# Patient Record
Sex: FEMALE | Race: BLACK OR AFRICAN AMERICAN | NOT HISPANIC OR LATINO | URBAN - METROPOLITAN AREA
[De-identification: names, ages, dates, MRNs, and addresses within clinical notes are randomized per-mention and may not be internally consistent; named-entity substitution may affect disease eponyms.]

---

## 2019-07-11 ENCOUNTER — OUTPATIENT (OUTPATIENT)
Dept: OUTPATIENT SERVICES | Facility: HOSPITAL | Age: 59
LOS: 1 days | End: 2019-07-11
Payer: COMMERCIAL

## 2019-07-11 VITALS
HEIGHT: 64 IN | WEIGHT: 220.02 LBS | RESPIRATION RATE: 16 BRPM | HEART RATE: 58 BPM | OXYGEN SATURATION: 96 % | TEMPERATURE: 98 F | DIASTOLIC BLOOD PRESSURE: 73 MMHG | SYSTOLIC BLOOD PRESSURE: 137 MMHG

## 2019-07-11 DIAGNOSIS — M17.11 UNILATERAL PRIMARY OSTEOARTHRITIS, RIGHT KNEE: ICD-10-CM

## 2019-07-11 DIAGNOSIS — Z01.818 ENCOUNTER FOR OTHER PREPROCEDURAL EXAMINATION: ICD-10-CM

## 2019-07-11 DIAGNOSIS — M25.561 PAIN IN RIGHT KNEE: ICD-10-CM

## 2019-07-11 DIAGNOSIS — Z98.51 TUBAL LIGATION STATUS: Chronic | ICD-10-CM

## 2019-07-11 LAB
BLD GP AB SCN SERPL QL: SIGNIFICANT CHANGE UP
HBA1C BLD-MCNC: 5.6 % — SIGNIFICANT CHANGE UP (ref 4–5.6)

## 2019-07-11 RX ORDER — TRANEXAMIC ACID 100 MG/ML
1000 INJECTION, SOLUTION INTRAVENOUS ONCE
Refills: 0 | Status: DISCONTINUED | OUTPATIENT
Start: 2019-07-18 | End: 2019-07-18

## 2019-07-11 RX ORDER — SODIUM CHLORIDE 9 MG/ML
3 INJECTION INTRAMUSCULAR; INTRAVENOUS; SUBCUTANEOUS EVERY 8 HOURS
Refills: 0 | Status: DISCONTINUED | OUTPATIENT
Start: 2019-07-18 | End: 2019-07-20

## 2019-07-11 NOTE — H&P PST ADULT - SYMPTOMS
Contacted by patient's PCP due to concern for increased diarrhea.  - spoke with patient. She reports that her diarrhea has been increased since leaving the hospital (previously 4BM/day, currently 10BM/day). Previously having intermittent incontinence (leakage), but now having ~daily episodes. No abdominal pain, no vomitus. Able to tolerating PO    - differential for worsening diarrhea is broad. Given recent hospitalization, need to exclude infectious etiology (i.e. C.diff). She has history of microscopic colitis though negative on last colonoscopy. CT with some abnormalities of pancreas (mild fatty involution of the pancreatic head; no mass or fat stranding) on 11/14/18. She was seen by vascular surgery for mesenteric stenosis but was felt to be non-significant during prior hospitalization.    Plan  - infectious studies: c.diff, PCR pathogen panel  - stool studies: fecal fat, fecal elastase  - will refer for EUS evaluation of pancreas  - blood tests for neuroendocrine process: gastrin, calcitonin, somatostatin, VIP, chromograninA, tryptase (will order in clinic due to PPI therapy and can give false positive so will need to hold for ~10-days unless contraindication)  - can consider evaluation by interventional cardiology for >60% SMA stenosis  - will request clinic follow-up    none

## 2019-07-11 NOTE — H&P PST ADULT - HISTORY OF PRESENT ILLNESS
59year old female with pmhx of hypertension, prediabetes, CAD, hyperlipidemia (diet controlled) asthma, obesity and 2 leaking heart valves? presents with c/o intermittent right knee pain with associating stiffness x 10years. Patient denies any pain today and is here for presurgical testing for scheduled Right knee replacement on 7/18/19

## 2019-07-11 NOTE — H&P PST ADULT - NSICDXPASTMEDICALHX_GEN_ALL_CORE_FT
PAST MEDICAL HISTORY:  Asthma     CAD (coronary artery disease)     Hyperlipidemia     Hypertension     Obesity     Prediabetes

## 2019-07-11 NOTE — H&P PST ADULT - NSICDXPROBLEM_GEN_ALL_CORE_FT
PROBLEM DIAGNOSES  Problem: Unilateral primary osteoarthritis, right knee  Assessment and Plan: Patient given preoperative instructions for scheduled Right Knee Total Replacement on 7/18/19. Preoperative instructions to include taking antihypertensives (losartan/hcts and atenolol) with a sip of water the morning of surgery. Patient verbalized understanding

## 2019-07-12 LAB
MRSA PCR RESULT.: SIGNIFICANT CHANGE UP
S AUREUS DNA NOSE QL NAA+PROBE: SIGNIFICANT CHANGE UP

## 2019-07-17 RX ORDER — CELECOXIB 200 MG/1
200 CAPSULE ORAL ONCE
Refills: 0 | Status: COMPLETED | OUTPATIENT
Start: 2019-07-17 | End: 2019-07-18

## 2019-07-18 ENCOUNTER — INPATIENT (INPATIENT)
Facility: HOSPITAL | Age: 59
LOS: 1 days | Discharge: HOME | DRG: 470 | End: 2019-07-20
Attending: ORTHOPAEDIC SURGERY | Admitting: ORTHOPAEDIC SURGERY
Payer: COMMERCIAL

## 2019-07-18 VITALS
RESPIRATION RATE: 16 BRPM | HEART RATE: 55 BPM | OXYGEN SATURATION: 99 % | WEIGHT: 220.02 LBS | TEMPERATURE: 98 F | HEIGHT: 64 IN | DIASTOLIC BLOOD PRESSURE: 92 MMHG | SYSTOLIC BLOOD PRESSURE: 163 MMHG

## 2019-07-18 DIAGNOSIS — M25.561 PAIN IN RIGHT KNEE: ICD-10-CM

## 2019-07-18 DIAGNOSIS — Z98.51 TUBAL LIGATION STATUS: Chronic | ICD-10-CM

## 2019-07-18 LAB
ANION GAP SERPL CALC-SCNC: 7 MMOL/L — SIGNIFICANT CHANGE UP (ref 5–17)
BLD GP AB SCN SERPL QL: SIGNIFICANT CHANGE UP
BUN SERPL-MCNC: 14 MG/DL — SIGNIFICANT CHANGE UP (ref 7–18)
CALCIUM SERPL-MCNC: 8.5 MG/DL — SIGNIFICANT CHANGE UP (ref 8.4–10.5)
CHLORIDE SERPL-SCNC: 104 MMOL/L — SIGNIFICANT CHANGE UP (ref 96–108)
CO2 SERPL-SCNC: 28 MMOL/L — SIGNIFICANT CHANGE UP (ref 22–31)
CREAT SERPL-MCNC: 0.89 MG/DL — SIGNIFICANT CHANGE UP (ref 0.5–1.3)
GLUCOSE BLDC GLUCOMTR-MCNC: 96 MG/DL — SIGNIFICANT CHANGE UP (ref 70–99)
GLUCOSE BLDC GLUCOMTR-MCNC: 98 MG/DL — SIGNIFICANT CHANGE UP (ref 70–99)
GLUCOSE SERPL-MCNC: 97 MG/DL — SIGNIFICANT CHANGE UP (ref 70–99)
HCG UR QL: NEGATIVE — SIGNIFICANT CHANGE UP
HCT VFR BLD CALC: 38.4 % — SIGNIFICANT CHANGE UP (ref 34.5–45)
HGB BLD-MCNC: 11.7 G/DL — SIGNIFICANT CHANGE UP (ref 11.5–15.5)
MCHC RBC-ENTMCNC: 26.5 PG — LOW (ref 27–34)
MCHC RBC-ENTMCNC: 30.5 GM/DL — LOW (ref 32–36)
MCV RBC AUTO: 86.9 FL — SIGNIFICANT CHANGE UP (ref 80–100)
NRBC # BLD: 0 /100 WBCS — SIGNIFICANT CHANGE UP (ref 0–0)
PLATELET # BLD AUTO: 302 K/UL — SIGNIFICANT CHANGE UP (ref 150–400)
POTASSIUM SERPL-MCNC: 3.9 MMOL/L — SIGNIFICANT CHANGE UP (ref 3.5–5.3)
POTASSIUM SERPL-SCNC: 3.9 MMOL/L — SIGNIFICANT CHANGE UP (ref 3.5–5.3)
RBC # BLD: 4.42 M/UL — SIGNIFICANT CHANGE UP (ref 3.8–5.2)
RBC # FLD: 13.2 % — SIGNIFICANT CHANGE UP (ref 10.3–14.5)
SODIUM SERPL-SCNC: 139 MMOL/L — SIGNIFICANT CHANGE UP (ref 135–145)
WBC # BLD: 5.89 K/UL — SIGNIFICANT CHANGE UP (ref 3.8–10.5)
WBC # FLD AUTO: 5.89 K/UL — SIGNIFICANT CHANGE UP (ref 3.8–10.5)

## 2019-07-18 PROCEDURE — 36415 COLL VENOUS BLD VENIPUNCTURE: CPT

## 2019-07-18 PROCEDURE — 86901 BLOOD TYPING SEROLOGIC RH(D): CPT

## 2019-07-18 PROCEDURE — 87641 MR-STAPH DNA AMP PROBE: CPT

## 2019-07-18 PROCEDURE — 88311 DECALCIFY TISSUE: CPT | Mod: 26

## 2019-07-18 PROCEDURE — 86900 BLOOD TYPING SEROLOGIC ABO: CPT

## 2019-07-18 PROCEDURE — G0463: CPT

## 2019-07-18 PROCEDURE — 83036 HEMOGLOBIN GLYCOSYLATED A1C: CPT

## 2019-07-18 PROCEDURE — 88305 TISSUE EXAM BY PATHOLOGIST: CPT | Mod: 26

## 2019-07-18 PROCEDURE — 87640 STAPH A DNA AMP PROBE: CPT

## 2019-07-18 PROCEDURE — 86850 RBC ANTIBODY SCREEN: CPT

## 2019-07-18 PROCEDURE — 73562 X-RAY EXAM OF KNEE 3: CPT | Mod: 26,RT

## 2019-07-18 RX ORDER — HYDROMORPHONE HYDROCHLORIDE 2 MG/ML
0.5 INJECTION INTRAMUSCULAR; INTRAVENOUS; SUBCUTANEOUS EVERY 6 HOURS
Refills: 0 | Status: DISCONTINUED | OUTPATIENT
Start: 2019-07-18 | End: 2019-07-19

## 2019-07-18 RX ORDER — ALBUTEROL 90 UG/1
2 AEROSOL, METERED ORAL
Qty: 0 | Refills: 0 | DISCHARGE

## 2019-07-18 RX ORDER — ASCORBIC ACID 60 MG
500 TABLET,CHEWABLE ORAL
Refills: 0 | Status: DISCONTINUED | OUTPATIENT
Start: 2019-07-18 | End: 2019-07-20

## 2019-07-18 RX ORDER — OXYCODONE HYDROCHLORIDE 5 MG/1
5 TABLET ORAL EVERY 4 HOURS
Refills: 0 | Status: DISCONTINUED | OUTPATIENT
Start: 2019-07-18 | End: 2019-07-20

## 2019-07-18 RX ORDER — MOMETASONE FUROATE 220 UG/1
0 INHALANT RESPIRATORY (INHALATION)
Qty: 0 | Refills: 0 | DISCHARGE

## 2019-07-18 RX ORDER — FAMOTIDINE 10 MG/ML
20 INJECTION INTRAVENOUS EVERY 12 HOURS
Refills: 0 | Status: DISCONTINUED | OUTPATIENT
Start: 2019-07-18 | End: 2019-07-20

## 2019-07-18 RX ORDER — MAGNESIUM HYDROXIDE 400 MG/1
30 TABLET, CHEWABLE ORAL DAILY
Refills: 0 | Status: DISCONTINUED | OUTPATIENT
Start: 2019-07-18 | End: 2019-07-20

## 2019-07-18 RX ORDER — ACETAMINOPHEN 500 MG
1000 TABLET ORAL EVERY 6 HOURS
Refills: 0 | Status: COMPLETED | OUTPATIENT
Start: 2019-07-18 | End: 2019-07-19

## 2019-07-18 RX ORDER — GABAPENTIN 400 MG/1
100 CAPSULE ORAL EVERY 8 HOURS
Refills: 0 | Status: DISCONTINUED | OUTPATIENT
Start: 2019-07-18 | End: 2019-07-20

## 2019-07-18 RX ORDER — KETOROLAC TROMETHAMINE 30 MG/ML
30 SYRINGE (ML) INJECTION EVERY 6 HOURS
Refills: 0 | Status: DISCONTINUED | OUTPATIENT
Start: 2019-07-18 | End: 2019-07-20

## 2019-07-18 RX ORDER — ATENOLOL 25 MG/1
1 TABLET ORAL
Qty: 0 | Refills: 0 | DISCHARGE

## 2019-07-18 RX ORDER — ENOXAPARIN SODIUM 100 MG/ML
30 INJECTION SUBCUTANEOUS EVERY 12 HOURS
Refills: 0 | Status: DISCONTINUED | OUTPATIENT
Start: 2019-07-19 | End: 2019-07-20

## 2019-07-18 RX ORDER — CEFAZOLIN SODIUM 1 G
1000 VIAL (EA) INJECTION EVERY 8 HOURS
Refills: 0 | Status: COMPLETED | OUTPATIENT
Start: 2019-07-19 | End: 2019-07-19

## 2019-07-18 RX ORDER — HYDROCHLOROTHIAZIDE 25 MG
12.5 TABLET ORAL DAILY
Refills: 0 | Status: DISCONTINUED | OUTPATIENT
Start: 2019-07-18 | End: 2019-07-20

## 2019-07-18 RX ORDER — FOLIC ACID 0.8 MG
1 TABLET ORAL DAILY
Refills: 0 | Status: DISCONTINUED | OUTPATIENT
Start: 2019-07-18 | End: 2019-07-20

## 2019-07-18 RX ORDER — LOSARTAN/HYDROCHLOROTHIAZIDE 100MG-25MG
1 TABLET ORAL
Qty: 0 | Refills: 0 | DISCHARGE

## 2019-07-18 RX ORDER — ATENOLOL 25 MG/1
25 TABLET ORAL DAILY
Refills: 0 | Status: DISCONTINUED | OUTPATIENT
Start: 2019-07-18 | End: 2019-07-20

## 2019-07-18 RX ORDER — SENNA PLUS 8.6 MG/1
2 TABLET ORAL AT BEDTIME
Refills: 0 | Status: DISCONTINUED | OUTPATIENT
Start: 2019-07-18 | End: 2019-07-20

## 2019-07-18 RX ORDER — MOMETASONE FUROATE 220 UG/1
1 INHALANT RESPIRATORY (INHALATION)
Refills: 0 | Status: DISCONTINUED | OUTPATIENT
Start: 2019-07-18 | End: 2019-07-18

## 2019-07-18 RX ORDER — HYDROMORPHONE HYDROCHLORIDE 2 MG/ML
0.5 INJECTION INTRAMUSCULAR; INTRAVENOUS; SUBCUTANEOUS
Refills: 0 | Status: DISCONTINUED | OUTPATIENT
Start: 2019-07-18 | End: 2019-07-18

## 2019-07-18 RX ORDER — GABAPENTIN 400 MG/1
100 CAPSULE ORAL ONCE
Refills: 0 | Status: COMPLETED | OUTPATIENT
Start: 2019-07-18 | End: 2019-07-18

## 2019-07-18 RX ORDER — TRAMADOL HYDROCHLORIDE 50 MG/1
50 TABLET ORAL ONCE
Refills: 0 | Status: DISCONTINUED | OUTPATIENT
Start: 2019-07-18 | End: 2019-07-18

## 2019-07-18 RX ORDER — LOSARTAN POTASSIUM 100 MG/1
100 TABLET, FILM COATED ORAL DAILY
Refills: 0 | Status: DISCONTINUED | OUTPATIENT
Start: 2019-07-18 | End: 2019-07-20

## 2019-07-18 RX ORDER — ONDANSETRON 8 MG/1
4 TABLET, FILM COATED ORAL ONCE
Refills: 0 | Status: DISCONTINUED | OUTPATIENT
Start: 2019-07-18 | End: 2019-07-18

## 2019-07-18 RX ORDER — FERROUS SULFATE 325(65) MG
325 TABLET ORAL
Refills: 0 | Status: DISCONTINUED | OUTPATIENT
Start: 2019-07-18 | End: 2019-07-20

## 2019-07-18 RX ORDER — SODIUM CHLORIDE 9 MG/ML
1000 INJECTION INTRAMUSCULAR; INTRAVENOUS; SUBCUTANEOUS
Refills: 0 | Status: DISCONTINUED | OUTPATIENT
Start: 2019-07-18 | End: 2019-07-20

## 2019-07-18 RX ORDER — CELECOXIB 200 MG/1
200 CAPSULE ORAL DAILY
Refills: 0 | Status: DISCONTINUED | OUTPATIENT
Start: 2019-07-19 | End: 2019-07-20

## 2019-07-18 RX ORDER — DOCUSATE SODIUM 100 MG
100 CAPSULE ORAL THREE TIMES A DAY
Refills: 0 | Status: DISCONTINUED | OUTPATIENT
Start: 2019-07-18 | End: 2019-07-20

## 2019-07-18 RX ORDER — SODIUM CHLORIDE 9 MG/ML
1000 INJECTION, SOLUTION INTRAVENOUS
Refills: 0 | Status: DISCONTINUED | OUTPATIENT
Start: 2019-07-18 | End: 2019-07-18

## 2019-07-18 RX ORDER — ALBUTEROL 90 UG/1
2 AEROSOL, METERED ORAL EVERY 6 HOURS
Refills: 0 | Status: DISCONTINUED | OUTPATIENT
Start: 2019-07-18 | End: 2019-07-20

## 2019-07-18 RX ORDER — ONDANSETRON 8 MG/1
4 TABLET, FILM COATED ORAL EVERY 6 HOURS
Refills: 0 | Status: DISCONTINUED | OUTPATIENT
Start: 2019-07-18 | End: 2019-07-20

## 2019-07-18 RX ORDER — TRAMADOL HYDROCHLORIDE 50 MG/1
50 TABLET ORAL EVERY 8 HOURS
Refills: 0 | Status: DISCONTINUED | OUTPATIENT
Start: 2019-07-18 | End: 2019-07-20

## 2019-07-18 RX ORDER — CHLORHEXIDINE GLUCONATE 213 G/1000ML
1 SOLUTION TOPICAL ONCE
Refills: 0 | Status: COMPLETED | OUTPATIENT
Start: 2019-07-18 | End: 2019-07-18

## 2019-07-18 RX ORDER — CEFAZOLIN SODIUM 1 G
1000 VIAL (EA) INJECTION EVERY 8 HOURS
Refills: 0 | Status: DISCONTINUED | OUTPATIENT
Start: 2019-07-18 | End: 2019-07-18

## 2019-07-18 RX ADMIN — CELECOXIB 200 MILLIGRAM(S): 200 CAPSULE ORAL at 12:58

## 2019-07-18 RX ADMIN — Medication 100 MILLIGRAM(S): at 21:46

## 2019-07-18 RX ADMIN — TRAMADOL HYDROCHLORIDE 50 MILLIGRAM(S): 50 TABLET ORAL at 21:46

## 2019-07-18 RX ADMIN — GABAPENTIN 100 MILLIGRAM(S): 400 CAPSULE ORAL at 12:56

## 2019-07-18 RX ADMIN — TRAMADOL HYDROCHLORIDE 50 MILLIGRAM(S): 50 TABLET ORAL at 21:59

## 2019-07-18 RX ADMIN — SODIUM CHLORIDE 3 MILLILITER(S): 9 INJECTION INTRAMUSCULAR; INTRAVENOUS; SUBCUTANEOUS at 13:00

## 2019-07-18 RX ADMIN — TRAMADOL HYDROCHLORIDE 50 MILLIGRAM(S): 50 TABLET ORAL at 12:56

## 2019-07-18 RX ADMIN — GABAPENTIN 100 MILLIGRAM(S): 400 CAPSULE ORAL at 21:46

## 2019-07-18 RX ADMIN — CHLORHEXIDINE GLUCONATE 1 APPLICATION(S): 213 SOLUTION TOPICAL at 12:30

## 2019-07-18 RX ADMIN — SODIUM CHLORIDE 3 MILLILITER(S): 9 INJECTION INTRAMUSCULAR; INTRAVENOUS; SUBCUTANEOUS at 21:43

## 2019-07-18 RX ADMIN — HYDROMORPHONE HYDROCHLORIDE 0.5 MILLIGRAM(S): 2 INJECTION INTRAMUSCULAR; INTRAVENOUS; SUBCUTANEOUS at 21:15

## 2019-07-18 RX ADMIN — HYDROMORPHONE HYDROCHLORIDE 0.5 MILLIGRAM(S): 2 INJECTION INTRAMUSCULAR; INTRAVENOUS; SUBCUTANEOUS at 20:57

## 2019-07-18 NOTE — DISCHARGE NOTE PROVIDER - NSDCCPCAREPLAN_GEN_ALL_CORE_FT
PRINCIPAL DISCHARGE DIAGNOSIS  Diagnosis: Unilateral primary osteoarthritis, right knee  Assessment and Plan of Treatment: Pain Management- See Attached Medication Reconciliation  **StretchStrap Stretching exercises for Total knee replacement***  Weight Bearing Status: WBAT to RLE   Equipment needs: Commode, Walker  Dressing: Please keep bandage/dressing Clean, Dry, and Intact.  Incision Site: REMOVE STAPLES on 08/02/19  STAPLES AND DRESSING TO BE REMOVED BY NURSE  NURSE TO APPLY STERI-STRIPS TO THE WOUND AFTER STAPLE REMOVAL   Dvt prophylaxis: D/C LOVENOX on 08/02/19  PT/Occupational Therapy are Activities of Daily Living as appropriate  Follow up with Orthopedic Surgeon, Dr. Lee after STAPLES ARE REMOVED in TWO weeks at: [phone]

## 2019-07-18 NOTE — DISCHARGE NOTE PROVIDER - CARE PROVIDER_API CALL
Malcom Lee)  Orthopaedic Surgery  [address] [place] [address]  Phone: [phone]  Fax: [phone]  Follow Up Time: 2 weeks

## 2019-07-18 NOTE — DISCHARGE NOTE PROVIDER - HOSPITAL COURSE
58 y/o F s/p elective right total knee replacement. Patient with no acute post-op complications. Patient received physical therapy and pain management throughout hospital stay.

## 2019-07-19 DIAGNOSIS — J45.909 UNSPECIFIED ASTHMA, UNCOMPLICATED: ICD-10-CM

## 2019-07-19 DIAGNOSIS — I25.10 ATHEROSCLEROTIC HEART DISEASE OF NATIVE CORONARY ARTERY WITHOUT ANGINA PECTORIS: ICD-10-CM

## 2019-07-19 DIAGNOSIS — G89.18 OTHER ACUTE POSTPROCEDURAL PAIN: ICD-10-CM

## 2019-07-19 DIAGNOSIS — M25.561 PAIN IN RIGHT KNEE: ICD-10-CM

## 2019-07-19 DIAGNOSIS — E78.5 HYPERLIPIDEMIA, UNSPECIFIED: ICD-10-CM

## 2019-07-19 DIAGNOSIS — I10 ESSENTIAL (PRIMARY) HYPERTENSION: ICD-10-CM

## 2019-07-19 DIAGNOSIS — E66.9 OBESITY, UNSPECIFIED: ICD-10-CM

## 2019-07-19 DIAGNOSIS — Z96.651 PRESENCE OF RIGHT ARTIFICIAL KNEE JOINT: ICD-10-CM

## 2019-07-19 LAB
ANION GAP SERPL CALC-SCNC: 6 MMOL/L — SIGNIFICANT CHANGE UP (ref 5–17)
BUN SERPL-MCNC: 11 MG/DL — SIGNIFICANT CHANGE UP (ref 7–18)
CALCIUM SERPL-MCNC: 8.6 MG/DL — SIGNIFICANT CHANGE UP (ref 8.4–10.5)
CHLORIDE SERPL-SCNC: 102 MMOL/L — SIGNIFICANT CHANGE UP (ref 96–108)
CO2 SERPL-SCNC: 28 MMOL/L — SIGNIFICANT CHANGE UP (ref 22–31)
CREAT SERPL-MCNC: 0.74 MG/DL — SIGNIFICANT CHANGE UP (ref 0.5–1.3)
GLUCOSE SERPL-MCNC: 127 MG/DL — HIGH (ref 70–99)
HCT VFR BLD CALC: 37.9 % — SIGNIFICANT CHANGE UP (ref 34.5–45)
HGB BLD-MCNC: 11.6 G/DL — SIGNIFICANT CHANGE UP (ref 11.5–15.5)
MCHC RBC-ENTMCNC: 26.1 PG — LOW (ref 27–34)
MCHC RBC-ENTMCNC: 30.6 GM/DL — LOW (ref 32–36)
MCV RBC AUTO: 85.4 FL — SIGNIFICANT CHANGE UP (ref 80–100)
NRBC # BLD: 0 /100 WBCS — SIGNIFICANT CHANGE UP (ref 0–0)
PLATELET # BLD AUTO: 286 K/UL — SIGNIFICANT CHANGE UP (ref 150–400)
POTASSIUM SERPL-MCNC: 3.7 MMOL/L — SIGNIFICANT CHANGE UP (ref 3.5–5.3)
POTASSIUM SERPL-SCNC: 3.7 MMOL/L — SIGNIFICANT CHANGE UP (ref 3.5–5.3)
RBC # BLD: 4.44 M/UL — SIGNIFICANT CHANGE UP (ref 3.8–5.2)
RBC # FLD: 13 % — SIGNIFICANT CHANGE UP (ref 10.3–14.5)
SODIUM SERPL-SCNC: 136 MMOL/L — SIGNIFICANT CHANGE UP (ref 135–145)
WBC # BLD: 9.92 K/UL — SIGNIFICANT CHANGE UP (ref 3.8–10.5)
WBC # FLD AUTO: 9.92 K/UL — SIGNIFICANT CHANGE UP (ref 3.8–10.5)

## 2019-07-19 RX ADMIN — Medication 1 MILLIGRAM(S): at 11:30

## 2019-07-19 RX ADMIN — FAMOTIDINE 20 MILLIGRAM(S): 10 INJECTION INTRAVENOUS at 05:34

## 2019-07-19 RX ADMIN — Medication 1 TABLET(S): at 11:30

## 2019-07-19 RX ADMIN — TRAMADOL HYDROCHLORIDE 50 MILLIGRAM(S): 50 TABLET ORAL at 13:32

## 2019-07-19 RX ADMIN — Medication 1000 MILLIGRAM(S): at 06:32

## 2019-07-19 RX ADMIN — ATENOLOL 25 MILLIGRAM(S): 25 TABLET ORAL at 05:34

## 2019-07-19 RX ADMIN — HYDROMORPHONE HYDROCHLORIDE 0.5 MILLIGRAM(S): 2 INJECTION INTRAMUSCULAR; INTRAVENOUS; SUBCUTANEOUS at 05:34

## 2019-07-19 RX ADMIN — SODIUM CHLORIDE 3 MILLILITER(S): 9 INJECTION INTRAMUSCULAR; INTRAVENOUS; SUBCUTANEOUS at 06:32

## 2019-07-19 RX ADMIN — Medication 100 MILLIGRAM(S): at 08:56

## 2019-07-19 RX ADMIN — GABAPENTIN 100 MILLIGRAM(S): 400 CAPSULE ORAL at 22:39

## 2019-07-19 RX ADMIN — Medication 325 MILLIGRAM(S): at 17:08

## 2019-07-19 RX ADMIN — Medication 1000 MILLIGRAM(S): at 17:08

## 2019-07-19 RX ADMIN — CELECOXIB 200 MILLIGRAM(S): 200 CAPSULE ORAL at 11:34

## 2019-07-19 RX ADMIN — TRAMADOL HYDROCHLORIDE 50 MILLIGRAM(S): 50 TABLET ORAL at 22:39

## 2019-07-19 RX ADMIN — Medication 1000 MILLIGRAM(S): at 00:19

## 2019-07-19 RX ADMIN — HYDROMORPHONE HYDROCHLORIDE 0.5 MILLIGRAM(S): 2 INJECTION INTRAMUSCULAR; INTRAVENOUS; SUBCUTANEOUS at 14:05

## 2019-07-19 RX ADMIN — SODIUM CHLORIDE 3 MILLILITER(S): 9 INJECTION INTRAMUSCULAR; INTRAVENOUS; SUBCUTANEOUS at 22:36

## 2019-07-19 RX ADMIN — CELECOXIB 200 MILLIGRAM(S): 200 CAPSULE ORAL at 11:35

## 2019-07-19 RX ADMIN — Medication 500 MILLIGRAM(S): at 05:34

## 2019-07-19 RX ADMIN — TRAMADOL HYDROCHLORIDE 50 MILLIGRAM(S): 50 TABLET ORAL at 23:29

## 2019-07-19 RX ADMIN — ONDANSETRON 4 MILLIGRAM(S): 8 TABLET, FILM COATED ORAL at 17:56

## 2019-07-19 RX ADMIN — Medication 100 MILLIGRAM(S): at 00:20

## 2019-07-19 RX ADMIN — GABAPENTIN 100 MILLIGRAM(S): 400 CAPSULE ORAL at 05:34

## 2019-07-19 RX ADMIN — Medication 325 MILLIGRAM(S): at 08:56

## 2019-07-19 RX ADMIN — Medication 1000 MILLIGRAM(S): at 05:34

## 2019-07-19 RX ADMIN — Medication 1000 MILLIGRAM(S): at 11:33

## 2019-07-19 RX ADMIN — Medication 100 MILLIGRAM(S): at 13:32

## 2019-07-19 RX ADMIN — TRAMADOL HYDROCHLORIDE 50 MILLIGRAM(S): 50 TABLET ORAL at 14:07

## 2019-07-19 RX ADMIN — SODIUM CHLORIDE 3 MILLILITER(S): 9 INJECTION INTRAMUSCULAR; INTRAVENOUS; SUBCUTANEOUS at 13:28

## 2019-07-19 RX ADMIN — Medication 1000 MILLIGRAM(S): at 11:30

## 2019-07-19 RX ADMIN — FAMOTIDINE 20 MILLIGRAM(S): 10 INJECTION INTRAVENOUS at 17:07

## 2019-07-19 RX ADMIN — Medication 100 MILLIGRAM(S): at 22:39

## 2019-07-19 RX ADMIN — HYDROMORPHONE HYDROCHLORIDE 0.5 MILLIGRAM(S): 2 INJECTION INTRAMUSCULAR; INTRAVENOUS; SUBCUTANEOUS at 06:00

## 2019-07-19 RX ADMIN — GABAPENTIN 100 MILLIGRAM(S): 400 CAPSULE ORAL at 13:32

## 2019-07-19 RX ADMIN — Medication 325 MILLIGRAM(S): at 11:30

## 2019-07-19 RX ADMIN — Medication 12.5 MILLIGRAM(S): at 05:34

## 2019-07-19 RX ADMIN — Medication 500 MILLIGRAM(S): at 17:07

## 2019-07-19 RX ADMIN — Medication 30 MILLIGRAM(S): at 08:56

## 2019-07-19 RX ADMIN — Medication 100 MILLIGRAM(S): at 05:34

## 2019-07-19 RX ADMIN — LOSARTAN POTASSIUM 100 MILLIGRAM(S): 100 TABLET, FILM COATED ORAL at 05:34

## 2019-07-19 RX ADMIN — Medication 30 MILLIGRAM(S): at 10:21

## 2019-07-19 RX ADMIN — Medication 1000 MILLIGRAM(S): at 18:03

## 2019-07-19 RX ADMIN — HYDROMORPHONE HYDROCHLORIDE 0.5 MILLIGRAM(S): 2 INJECTION INTRAMUSCULAR; INTRAVENOUS; SUBCUTANEOUS at 14:50

## 2019-07-19 RX ADMIN — ENOXAPARIN SODIUM 30 MILLIGRAM(S): 100 INJECTION SUBCUTANEOUS at 06:38

## 2019-07-19 RX ADMIN — Medication 1000 MILLIGRAM(S): at 01:12

## 2019-07-19 RX ADMIN — ENOXAPARIN SODIUM 30 MILLIGRAM(S): 100 INJECTION SUBCUTANEOUS at 18:13

## 2019-07-19 NOTE — PHYSICAL THERAPY INITIAL EVALUATION ADULT - GENERAL OBSERVATIONS, REHAB EVAL
Consult received, chart reviewed. Patient received supine in bed, NAD, +SCDs, +IV (disconnected by RN). Patient agreed to EVALUATION from Physical Therapist. Pt. with 7/10 Rt. knee pain, reports meds ~1 hr ago and eager to attempt PT

## 2019-07-19 NOTE — PHYSICAL THERAPY INITIAL EVALUATION ADULT - ACTIVE RANGE OF MOTION EXAMINATION, REHAB EVAL
bilateral upper extremity Active ROM was WFL (within functional limits)/bilateral  lower extremity Active ROM was WFL (within functional limits)/except Rt. knee 0-80 deg, pain limite. Rt. hip 3~3/4 range

## 2019-07-19 NOTE — CONSULT NOTE ADULT - PROBLEM SELECTOR RECOMMENDATION 9
S/p right total knee replacement  incentive spirometry  pain control  DVT PPX  Ortho follow up  PT/Rehab

## 2019-07-19 NOTE — PHYSICAL THERAPY INITIAL EVALUATION ADULT - GAIT DISTANCE, PT EVAL
8ft, limited by pain an nausea (Pt. just ate breakfast prior to PT, post amb. had episode of vomiting, and reported significant improvement)

## 2019-07-19 NOTE — PHYSICAL THERAPY INITIAL EVALUATION ADULT - PLANNED THERAPY INTERVENTIONS, PT EVAL
transfer training/balance training/bed mobility training/ROM/gait training/postural re-education/strengthening/stretching

## 2019-07-19 NOTE — PHYSICAL THERAPY INITIAL EVALUATION ADULT - PASSIVE RANGE OF MOTION EXAMINATION, REHAB EVAL
except Rt. knee 0-85 deg, pain limited/bilateral lower extremity Passive ROM was WFL (within functional limits)/bilateral upper extremity Passive ROM was WFL (within functional limits)

## 2019-07-19 NOTE — PHYSICAL THERAPY INITIAL EVALUATION ADULT - BALANCE DISTURBANCE, IDENTIFIED IMPAIRMENT CONTRIBUTE, REHAB EVAL
impaired postural control/decreased strength/dizziness and nausea (both resolved post session sitting in chair)

## 2019-07-19 NOTE — DISCHARGE NOTE NURSING/CASE MANAGEMENT/SOCIAL WORK - NSDCDPATPORTLINK_GEN_ALL_CORE
You can access the [email or web address] Patient Portal, offered by [facility], by registering with the following website: [email or web address]

## 2019-07-19 NOTE — CONSULT NOTE ADULT - SUBJECTIVE AND OBJECTIVE BOX
Chief Complaint: right knee pain    HPI:  59year old female with pmhx of hypertension, prediabetes, CAD, hyperlipidemia (diet controlled) asthma, obesity and 2 leaking heart valves? presents with c/o intermittent right knee pain with associating stiffness x 10years. Patient denies any pain today and is here for presurgical testing for scheduled Right knee replacement on 7/18/19 (11 Jul 2019 10:39).    59 year old female s/p right knee replacement, pod#1.  Pt oob and in chair.  + complaints of right knee pain.  Pt states that pain is not relieved with toradol.  No nausea or vomiting  No chest pain or sob.  Pt was seen by PT today.        PAST MEDICAL & SURGICAL HISTORY:  Obesity  Prediabetes  Hyperlipidemia  CAD (coronary artery disease)  Asthma  Hypertension  History of tubal ligation      FAMILY HISTORY:  Family hx of hypertension  Family history of diabetes mellitus      SOCIAL HISTORY:  [x ] Denies Smoking, Alcohol, or Drug Use    Allergies    No Known Allergies    Intolerances        PAIN MEDICATIONS:  acetaminophen   Tablet .. 1000 milliGRAM(s) Oral every 6 hours  celecoxib 200 milliGRAM(s) Oral daily  gabapentin 100 milliGRAM(s) Oral every 8 hours  HYDROmorphone  Injectable 0.5 milliGRAM(s) IV Push every 6 hours PRN  ketorolac   Injectable 30 milliGRAM(s) IV Push every 6 hours PRN  ondansetron Injectable 4 milliGRAM(s) IV Push every 6 hours PRN  oxyCODONE    IR 5 milliGRAM(s) Oral every 4 hours PRN  traMADol 50 milliGRAM(s) Oral every 8 hours      Heme:  enoxaparin Injectable 30 milliGRAM(s) SubCutaneous every 12 hours    Antibiotics:    Cardiovascular:  ATENolol  Tablet 25 milliGRAM(s) Oral daily  hydrochlorothiazide 12.5 milliGRAM(s) Oral daily  losartan 100 milliGRAM(s) Oral daily    GI:  aluminum hydroxide/magnesium hydroxide/simethicone Suspension 30 milliLiter(s) Oral four times a day PRN  docusate sodium 100 milliGRAM(s) Oral three times a day  famotidine    Tablet 20 milliGRAM(s) Oral every 12 hours  magnesium hydroxide Suspension 30 milliLiter(s) Oral daily PRN  senna 2 Tablet(s) Oral at bedtime PRN    Endocrine:    All Other Medications:  ascorbic acid 500 milliGRAM(s) Oral two times a day  ferrous    sulfate 325 milliGRAM(s) Oral three times a day with meals  folic acid 1 milliGRAM(s) Oral daily  multivitamin 1 Tablet(s) Oral daily  sodium chloride 0.9% lock flush 3 milliLiter(s) IV Push every 8 hours  sodium chloride 0.9%. 1000 milliLiter(s) IV Continuous <Continuous>      REVIEW OF SYSTEMS:    CONSTITUTIONAL: No fever, weight loss, or fatigue  RESPIRATORY: No cough, wheezing, chills, or hemoptysis, No SOB  NECK: No neck pain  CARDIOVASCULAR: No chest pain, palpitations, dizziness, or leg swelling  GASTROINTESTINAL: No abdominal or epigastric pain.  No nausea, vomiting, or hematemesis.  No diarrhea. + constipation.  GENITOURINARY: No dysuria, frequency, hematuria or incontinence  NEUROLOGICAL: No headaches, memory loss, loss of strength, numbness or tremors  MUSCULOSKELETAL: + right knee pain    Vital Signs Last 24 Hrs  T(C): 37.2 (19 Jul 2019 05:16), Max: 37.2 (19 Jul 2019 05:16)  T(F): 98.9 (19 Jul 2019 05:16), Max: 98.9 (19 Jul 2019 05:16)  HR: 67 (19 Jul 2019 09:28) (60 - 77)  BP: 110/75 (19 Jul 2019 09:28) (110/75 - 156/66)  BP(mean): --  RR: 17 (19 Jul 2019 05:16) (16 - 20)  SpO2: 97% (19 Jul 2019 09:28) (96% - 100%)    PAIN SCORE:     5/10    SCALE USED: (1-10 VNRS)    PHYSICAL EXAM:    GENERAL: NAD, well-groomed, well-developed   NERVOUS SYSTEM: Alert and oriented x3, Motor strength 5/5 B/L upper and lower extremities, SLR -   CHEST/LUNG: Clear to percussion bilaterally, no rale, rhonchi or wheezing  HEART: Regular rate and rhythm, No murmurs, rubs, or gallops   ABDOMEN: Soft, nontender, nondistended, Bowel sounds present  BACK: No CVA tenderness, No paravertebral tenderness  EXTREMITIES: +2 peripheral extremities, no edema, cyanosis + decreased rom  + right knee tenderness    LABS:                          11.6   9.92  )-----------( 286      ( 19 Jul 2019 06:13 )             37.9     07-19    136  |  102  |  11  ----------------------------<  127<H>  3.7   |  28  |  0.74    Ca    8.6      19 Jul 2019 06:13            RADIOLOGY:    Drug Screen:        RECOMMENDATIONS    [ ]  [place]  Reviewed and Copied to Chart

## 2019-07-19 NOTE — CONSULT NOTE ADULT - SUBJECTIVE AND OBJECTIVE BOX
Patient is a 59y old  Female who presents with a chief complaint of R TKA (19 Jul 2019 08:49)  History of Present Illness:  History of Present Illness		  59year old female with pmhx of hypertension, prediabetes, CAD, hyperlipidemia (diet controlled) asthma, obesity and 2 leaking heart valves? presents with c/o intermittent right knee pain with associating stiffness x 10years. Patient denies any pain today and is here for presurgical testing for scheduled Right knee replacement on 7/18/19 . Awake, alert, comfortable out of bed in NAD.      INTERVAL HPI/OVERNIGHT EVENTS:  T(C): 37.2 (07-19-19 @ 05:16), Max: 37.2 (07-19-19 @ 05:16)  HR: 67 (07-19-19 @ 09:28) (55 - 77)  BP: 110/75 (07-19-19 @ 09:28) (110/75 - 163/92)  RR: 17 (07-19-19 @ 05:16) (16 - 20)  SpO2: 97% (07-19-19 @ 09:28) (96% - 100%)  Wt(kg): --  I&O's Summary    18 Jul 2019 07:01  -  19 Jul 2019 07:00  --------------------------------------------------------  IN: 1000 mL / OUT: 400 mL / NET: 600 mL        PAST MEDICAL & SURGICAL HISTORY:  Obesity  Prediabetes  Hyperlipidemia  CAD (coronary artery disease)  Asthma  Hypertension  History of tubal ligation      SOCIAL HISTORY  Alcohol:  Tobacco:  Illicit substance use:      FAMILY HISTORY:      LABS:                        11.6   9.92  )-----------( 286      ( 19 Jul 2019 06:13 )             37.9     07-19    136  |  102  |  11  ----------------------------<  127<H>  3.7   |  28  |  0.74    Ca    8.6      19 Jul 2019 06:13          CAPILLARY BLOOD GLUCOSE      POCT Blood Glucose.: 98 mg/dL (18 Jul 2019 18:44)  POCT Blood Glucose.: 96 mg/dL (18 Jul 2019 12:39)            MEDICATIONS  (STANDING):  acetaminophen   Tablet .. 1000 milliGRAM(s) Oral every 6 hours  ascorbic acid 500 milliGRAM(s) Oral two times a day  ATENolol  Tablet 25 milliGRAM(s) Oral daily  celecoxib 200 milliGRAM(s) Oral daily  docusate sodium 100 milliGRAM(s) Oral three times a day  enoxaparin Injectable 30 milliGRAM(s) SubCutaneous every 12 hours  famotidine    Tablet 20 milliGRAM(s) Oral every 12 hours  ferrous    sulfate 325 milliGRAM(s) Oral three times a day with meals  folic acid 1 milliGRAM(s) Oral daily  gabapentin 100 milliGRAM(s) Oral every 8 hours  hydrochlorothiazide 12.5 milliGRAM(s) Oral daily  losartan 100 milliGRAM(s) Oral daily  multivitamin 1 Tablet(s) Oral daily  sodium chloride 0.9% lock flush 3 milliLiter(s) IV Push every 8 hours  sodium chloride 0.9%. 1000 milliLiter(s) (140 mL/Hr) IV Continuous <Continuous>  traMADol 50 milliGRAM(s) Oral every 8 hours    MEDICATIONS  (PRN):  ALBUTerol    90 MICROgram(s) HFA Inhaler 2 Puff(s) Inhalation every 6 hours PRN Shortness of Breath and/or Wheezing  aluminum hydroxide/magnesium hydroxide/simethicone Suspension 30 milliLiter(s) Oral four times a day PRN Indigestion  HYDROmorphone  Injectable 0.5 milliGRAM(s) IV Push every 6 hours PRN Severe Pain (7 - 10)  ketorolac   Injectable 30 milliGRAM(s) IV Push every 6 hours PRN Severe Pain (7 - 10)  magnesium hydroxide Suspension 30 milliLiter(s) Oral daily PRN Constipation  ondansetron Injectable 4 milliGRAM(s) IV Push every 6 hours PRN Nausea and/or Vomiting  oxyCODONE    IR 5 milliGRAM(s) Oral every 4 hours PRN Moderate Pain (4 - 6)  senna 2 Tablet(s) Oral at bedtime PRN Constipation      REVIEW OF SYSTEMS:  CONSTITUTIONAL: No fever, weight loss, or fatigue  EYES: No eye pain, visual disturbances, or discharge  ENMT:  No difficulty hearing, tinnitus, vertigo; No sinus or throat pain  NECK: No pain or stiffness  RESPIRATORY: No cough, wheezing, chills or hemoptysis; No shortness of breath  CARDIOVASCULAR: No chest pain, palpitations, dizziness, or leg swelling  GASTROINTESTINAL: No abdominal or epigastric pain. No nausea, vomiting, or hematemesis; No diarrhea or constipation. No melena or hematochezia.  GENITOURINARY: No dysuria, frequency, hematuria, or incontinence  NEUROLOGICAL: No headaches, memory loss, loss of strength, numbness, or tremors  SKIN: No itching, burning, rashes, or lesions   LYMPH NODES: No enlarged glands  ENDOCRINE: No heat or cold intolerance; No hair loss  MUSCULOSKELETAL: + joint pain + swelling; No muscle, back, or extremity pain  PSYCHIATRIC: No depression, anxiety, mood swings, or difficulty sleeping  HEME/LYMPH: No easy bruising, or bleeding gums  ALLERY AND IMMUNOLOGIC: No hives or eczema    PHYSICAL EXAM:  GENERAL: NAD, well-groomed, well-developed  HEAD:  Atraumatic, Normocephalic  EYES: EOMI, PERRLA, conjunctiva and sclera clear  ENMT: No tonsillar erythema, exudates, or enlargement; Moist mucous membranes, Good dentition, No lesions  NECK: Supple, No JVD, Normal thyroid  NERVOUS SYSTEM:  Alert & Oriented X3, Good concentration; Motor Strength 5/5 B/L upper and lower extremities; DTRs 2+ intact and symmetric  CHEST/LUNG: Clear to percussion bilaterally; No rales, rhonchi, wheezing, or rubs  HEART: Regular rate and rhythm; No murmurs, rubs, or gallops  ABDOMEN: Soft, Nontender, Nondistended; Bowel sounds present  EXTREMITIES:  2+ Peripheral Pulses, No clubbing, cyanosis, + edema  LYMPH: No lymphadenopathy noted  SKIN: No rashes or lesions    RADIOLOGY & ADDITIONAL TESTS:    Imaging Personally Reviewed:  [ x] YES  [ ] NO    Consultant(s) Notes Reviewed:  [x ] YES  [ ] NO        Care Discussed with Consultants/Other Providers [x ] YES  [ ] NO

## 2019-07-19 NOTE — CONSULT NOTE ADULT - PROBLEM SELECTOR RECOMMENDATION 9
- Tramadol 50mg q 8 hours  - acetaminophen 1 gram q 6 hours for 24 hours only  - oxycodone 5 mg po q 6 hours prn - moderate pain  - celebrex 200mg po daily  - gabapentin 100mg po q 8 hours  - dc iv opioids  - stool softeners  - PT eval / oob  - ice to extremity  - toradol iv prn - severe pain

## 2019-07-19 NOTE — PHYSICAL THERAPY INITIAL EVALUATION ADULT - CRITERIA FOR SKILLED THERAPEUTIC INTERVENTIONS
anticipated equipment needs at discharge/rehab potential/predicted duration of therapy intervention/therapy frequency/functional limitations in following categories/risk reduction/prevention/impairments found/anticipated discharge recommendation

## 2019-07-19 NOTE — PHYSICAL THERAPY INITIAL EVALUATION ADULT - LIVES WITH, PROFILE
Son in a private house, 5 stairs to enter with either side rails available, once inside bedroom and bathroom on main floor

## 2019-07-20 VITALS
SYSTOLIC BLOOD PRESSURE: 129 MMHG | DIASTOLIC BLOOD PRESSURE: 71 MMHG | OXYGEN SATURATION: 100 % | TEMPERATURE: 99 F | HEART RATE: 77 BPM | RESPIRATION RATE: 17 BRPM

## 2019-07-20 LAB
ANION GAP SERPL CALC-SCNC: 6 MMOL/L — SIGNIFICANT CHANGE UP (ref 5–17)
BUN SERPL-MCNC: 14 MG/DL — SIGNIFICANT CHANGE UP (ref 7–18)
CALCIUM SERPL-MCNC: 8.6 MG/DL — SIGNIFICANT CHANGE UP (ref 8.4–10.5)
CHLORIDE SERPL-SCNC: 103 MMOL/L — SIGNIFICANT CHANGE UP (ref 96–108)
CO2 SERPL-SCNC: 29 MMOL/L — SIGNIFICANT CHANGE UP (ref 22–31)
CREAT SERPL-MCNC: 0.86 MG/DL — SIGNIFICANT CHANGE UP (ref 0.5–1.3)
GLUCOSE SERPL-MCNC: 112 MG/DL — HIGH (ref 70–99)
HCT VFR BLD CALC: 37.1 % — SIGNIFICANT CHANGE UP (ref 34.5–45)
HGB BLD-MCNC: 11.2 G/DL — LOW (ref 11.5–15.5)
MCHC RBC-ENTMCNC: 25.9 PG — LOW (ref 27–34)
MCHC RBC-ENTMCNC: 30.2 GM/DL — LOW (ref 32–36)
MCV RBC AUTO: 85.7 FL — SIGNIFICANT CHANGE UP (ref 80–100)
NRBC # BLD: 0 /100 WBCS — SIGNIFICANT CHANGE UP (ref 0–0)
PLATELET # BLD AUTO: 266 K/UL — SIGNIFICANT CHANGE UP (ref 150–400)
POTASSIUM SERPL-MCNC: 3.8 MMOL/L — SIGNIFICANT CHANGE UP (ref 3.5–5.3)
POTASSIUM SERPL-SCNC: 3.8 MMOL/L — SIGNIFICANT CHANGE UP (ref 3.5–5.3)
RBC # BLD: 4.33 M/UL — SIGNIFICANT CHANGE UP (ref 3.8–5.2)
RBC # FLD: 13.2 % — SIGNIFICANT CHANGE UP (ref 10.3–14.5)
SODIUM SERPL-SCNC: 138 MMOL/L — SIGNIFICANT CHANGE UP (ref 135–145)
WBC # BLD: 9.79 K/UL — SIGNIFICANT CHANGE UP (ref 3.8–10.5)
WBC # FLD AUTO: 9.79 K/UL — SIGNIFICANT CHANGE UP (ref 3.8–10.5)

## 2019-07-20 PROCEDURE — 82962 GLUCOSE BLOOD TEST: CPT

## 2019-07-20 PROCEDURE — 86901 BLOOD TYPING SEROLOGIC RH(D): CPT

## 2019-07-20 PROCEDURE — 88305 TISSUE EXAM BY PATHOLOGIST: CPT

## 2019-07-20 PROCEDURE — 73562 X-RAY EXAM OF KNEE 3: CPT

## 2019-07-20 PROCEDURE — 88311 DECALCIFY TISSUE: CPT

## 2019-07-20 PROCEDURE — 97162 PT EVAL MOD COMPLEX 30 MIN: CPT

## 2019-07-20 PROCEDURE — 36415 COLL VENOUS BLD VENIPUNCTURE: CPT

## 2019-07-20 PROCEDURE — 86850 RBC ANTIBODY SCREEN: CPT

## 2019-07-20 PROCEDURE — C1776: CPT

## 2019-07-20 PROCEDURE — 97110 THERAPEUTIC EXERCISES: CPT

## 2019-07-20 PROCEDURE — 86900 BLOOD TYPING SEROLOGIC ABO: CPT

## 2019-07-20 PROCEDURE — C1713: CPT

## 2019-07-20 PROCEDURE — 97530 THERAPEUTIC ACTIVITIES: CPT

## 2019-07-20 PROCEDURE — 80048 BASIC METABOLIC PNL TOTAL CA: CPT

## 2019-07-20 PROCEDURE — 97116 GAIT TRAINING THERAPY: CPT

## 2019-07-20 PROCEDURE — 85027 COMPLETE CBC AUTOMATED: CPT

## 2019-07-20 PROCEDURE — 81025 URINE PREGNANCY TEST: CPT

## 2019-07-20 RX ORDER — FOLIC ACID 0.8 MG
1 TABLET ORAL
Qty: 14 | Refills: 0
Start: 2019-07-20 | End: 2019-08-02

## 2019-07-20 RX ORDER — CELECOXIB 200 MG/1
1 CAPSULE ORAL
Qty: 7 | Refills: 0
Start: 2019-07-20 | End: 2019-07-26

## 2019-07-20 RX ORDER — ENOXAPARIN SODIUM 100 MG/ML
40 INJECTION SUBCUTANEOUS
Qty: 13 | Refills: 0
Start: 2019-07-20 | End: 2019-07-26

## 2019-07-20 RX ORDER — DOCUSATE SODIUM 100 MG
1 CAPSULE ORAL
Qty: 28 | Refills: 0
Start: 2019-07-20 | End: 2019-08-02

## 2019-07-20 RX ORDER — ASCORBIC ACID 60 MG
1 TABLET,CHEWABLE ORAL
Qty: 14 | Refills: 0
Start: 2019-07-20 | End: 2019-08-02

## 2019-07-20 RX ORDER — GABAPENTIN 400 MG/1
1 CAPSULE ORAL
Qty: 21 | Refills: 0
Start: 2019-07-20 | End: 2019-07-26

## 2019-07-20 RX ORDER — FERROUS SULFATE 325(65) MG
1 TABLET ORAL
Qty: 30 | Refills: 0
Start: 2019-07-20 | End: 2019-08-03

## 2019-07-20 RX ADMIN — CELECOXIB 200 MILLIGRAM(S): 200 CAPSULE ORAL at 12:19

## 2019-07-20 RX ADMIN — OXYCODONE HYDROCHLORIDE 5 MILLIGRAM(S): 5 TABLET ORAL at 11:54

## 2019-07-20 RX ADMIN — Medication 100 MILLIGRAM(S): at 06:10

## 2019-07-20 RX ADMIN — OXYCODONE HYDROCHLORIDE 5 MILLIGRAM(S): 5 TABLET ORAL at 12:19

## 2019-07-20 RX ADMIN — Medication 500 MILLIGRAM(S): at 06:10

## 2019-07-20 RX ADMIN — LOSARTAN POTASSIUM 100 MILLIGRAM(S): 100 TABLET, FILM COATED ORAL at 06:09

## 2019-07-20 RX ADMIN — TRAMADOL HYDROCHLORIDE 50 MILLIGRAM(S): 50 TABLET ORAL at 07:12

## 2019-07-20 RX ADMIN — Medication 1 MILLIGRAM(S): at 11:54

## 2019-07-20 RX ADMIN — ENOXAPARIN SODIUM 30 MILLIGRAM(S): 100 INJECTION SUBCUTANEOUS at 07:32

## 2019-07-20 RX ADMIN — Medication 325 MILLIGRAM(S): at 07:32

## 2019-07-20 RX ADMIN — ATENOLOL 25 MILLIGRAM(S): 25 TABLET ORAL at 06:11

## 2019-07-20 RX ADMIN — GABAPENTIN 100 MILLIGRAM(S): 400 CAPSULE ORAL at 06:09

## 2019-07-20 RX ADMIN — Medication 325 MILLIGRAM(S): at 11:54

## 2019-07-20 RX ADMIN — Medication 12.5 MILLIGRAM(S): at 06:09

## 2019-07-20 RX ADMIN — CELECOXIB 200 MILLIGRAM(S): 200 CAPSULE ORAL at 11:54

## 2019-07-20 RX ADMIN — SODIUM CHLORIDE 3 MILLILITER(S): 9 INJECTION INTRAMUSCULAR; INTRAVENOUS; SUBCUTANEOUS at 06:06

## 2019-07-20 RX ADMIN — Medication 1 TABLET(S): at 11:54

## 2019-07-20 RX ADMIN — TRAMADOL HYDROCHLORIDE 50 MILLIGRAM(S): 50 TABLET ORAL at 06:12

## 2019-07-20 RX ADMIN — FAMOTIDINE 20 MILLIGRAM(S): 10 INJECTION INTRAVENOUS at 06:10

## 2019-07-20 NOTE — PROGRESS NOTE ADULT - PROBLEM SELECTOR PLAN 1
S/p right total knee replacement  incentive spirometry  pain control  DVT PPX  Ortho follow up  PT/Rehab.

## 2019-07-20 NOTE — PROGRESS NOTE ADULT - SUBJECTIVE AND OBJECTIVE BOX
pt seen in icu [  ], reg med floor [ x  ], bed [ x ], chair at bedside [   ]    Awake ,alert , lying in bed in NAD. S/p Total knee replacement. Feeling better. Denies fever , chills , cough or sob at this moment.     REVIEW OF SYSTEMS:    CONSTITUTIONAL: No weakness, fevers or chills  EYES/ENT: No visual changes;  No vertigo or throat pain   NECK: No pain or stiffness  RESPIRATORY: No cough, wheezing, hemoptysis; No shortness of breath  CARDIOVASCULAR: No chest pain or palpitations  GASTROINTESTINAL: No abdominal or epigastric pain. No nausea, vomiting, or hematemesis; No diarrhea or constipation. No melena or hematochezia.  GENITOURINARY: No dysuria, frequency or hematuria  NEUROLOGICAL: No numbness or weakness  SKIN: No itching, burning, rashes, or lesions   All other review of systems is negative unless indicated above.    Physical Exam    General: WN/WD NAD  Neurology: A&Ox3, nonfocal, LYNN x 4  Respiratory: CTA B/L  CV: RRR, S1S2, no murmurs, rubs or gallops  Abdominal: Soft, NT, ND +BS, Last BM  Extremities: + incisional tenderness      Allergies  No Known Allergies      Health Issues  Right knee pain  Obesity  Prediabetes  Hyperlipidemia  CAD (coronary artery disease)  Asthma  Hypertension  History of tubal ligation      Vitals  T(F): 99 (07-20-19 @ 05:14), Max: 99 (07-20-19 @ 05:14)  HR: 77 (07-20-19 @ 05:14) (65 - 77)  BP: 129/71 (07-20-19 @ 05:14) (129/71 - 146/69)  RR: 17 (07-20-19 @ 05:14) (17 - 18)  SpO2: 100% (07-20-19 @ 05:14) (95% - 100%)  Wt(kg): --  CAPILLARY BLOOD GLUCOSE      POCT Blood Glucose.: 98 mg/dL (18 Jul 2019 18:44)      Labs                          11.2   9.79  )-----------( 266      ( 20 Jul 2019 06:47 )             37.1       07-20    138  |  103  |  14  ----------------------------<  112<H>  3.8   |  29  |  0.86    Ca    8.6      20 Jul 2019 06:47              Radiology Results  < from: Xray Knee 3 Views, Right (07.18.19 @ 19:29) >  Impression: Unremarkable right knee replacement.    No evidence for an acute fracture or dislocation.    Appropriate postoperative soft tissue air and anterior skin staples   adjacent to the right knee.    < end of copied text >      Meds    MEDICATIONS  (STANDING):  ascorbic acid 500 milliGRAM(s) Oral two times a day  ATENolol  Tablet 25 milliGRAM(s) Oral daily  celecoxib 200 milliGRAM(s) Oral daily  docusate sodium 100 milliGRAM(s) Oral three times a day  enoxaparin Injectable 30 milliGRAM(s) SubCutaneous every 12 hours  famotidine    Tablet 20 milliGRAM(s) Oral every 12 hours  ferrous    sulfate 325 milliGRAM(s) Oral three times a day with meals  folic acid 1 milliGRAM(s) Oral daily  gabapentin 100 milliGRAM(s) Oral every 8 hours  hydrochlorothiazide 12.5 milliGRAM(s) Oral daily  losartan 100 milliGRAM(s) Oral daily  multivitamin 1 Tablet(s) Oral daily  sodium chloride 0.9% lock flush 3 milliLiter(s) IV Push every 8 hours  sodium chloride 0.9%. 1000 milliLiter(s) (140 mL/Hr) IV Continuous <Continuous>  traMADol 50 milliGRAM(s) Oral every 8 hours      MEDICATIONS  (PRN):  ALBUTerol    90 MICROgram(s) HFA Inhaler 2 Puff(s) Inhalation every 6 hours PRN Shortness of Breath and/or Wheezing  aluminum hydroxide/magnesium hydroxide/simethicone Suspension 30 milliLiter(s) Oral four times a day PRN Indigestion  bisacodyl Suppository 10 milliGRAM(s) Rectal daily PRN If no bowel movement by postoperative day #2  ketorolac   Injectable 30 milliGRAM(s) IV Push every 6 hours PRN Severe Pain (7 - 10)  magnesium hydroxide Suspension 30 milliLiter(s) Oral daily PRN Constipation  ondansetron Injectable 4 milliGRAM(s) IV Push every 6 hours PRN Nausea and/or Vomiting  oxyCODONE    IR 5 milliGRAM(s) Oral every 4 hours PRN Moderate Pain (4 - 6)  senna 2 Tablet(s) Oral at bedtime PRN Constipation
59yFemale    Diagnosis:  S/p R Total Knee Replacement POD# 2    Patient was seen and evaluated at bedside. Patient with no acute complaints.   Pain is well controlled. Pt ambulating well with PT.   Denies CP/SOB, dyspnea, paresthesias, N/V/D, palpitations.     Vital Signs Last 24 Hrs  T(C): 37.2 (20 Jul 2019 05:14), Max: 37.2 (20 Jul 2019 05:14)  T(F): 99 (20 Jul 2019 05:14), Max: 99 (20 Jul 2019 05:14)  HR: 77 (20 Jul 2019 05:14) (65 - 77)  BP: 129/71 (20 Jul 2019 05:14) (129/71 - 146/69)  BP(mean): --  RR: 17 (20 Jul 2019 05:14) (17 - 18)  SpO2: 100% (20 Jul 2019 05:14) (95% - 100%)  I&O's Detail      Physical Exam:    General: AAOx3, NAD, resting comfortably in bed.    R KNEE:  Dressing is C/D/I. Dressing changed today- Skin is pink and warm. Staples intact. No erythema. SILT.  Wound with no drainage, healing well.   Lower extremity:  No calf tenderness, calves are soft. 2+pulses. NVI. (+)EHL/FHL/ADF/APF.  Good capillary refill.                           11.2   9.79  )-----------( 266      ( 20 Jul 2019 06:47 )             37.1     07-20    138  |  103  |  14  ----------------------------<  112<H>  3.8   |  29  |  0.86    Ca    8.6      20 Jul 2019 06:47        Impression:  59yFemale S/p R Total Knee Replacement POD# 2  Plan:  -  Pain management  -  Dvt prophylaxis  -  Daily Physical Therapy:  WBAT to RLE  -  Discharge planning: Home today  -  Heel bump to RLE  -  Dressing changed today   -  Incentive Spirometer  -  Case d/w DR. Lee
Ortho Note POD# 1  59yFemale    Diagnosis:  S/p R Total Knee Replacement POD# 1    Patient is seen and evaluated at bedside; offers no acute complaints. Pain is mild; well controlled.  Awaiting PT for ambulation.    Vital Signs Last 24 Hrs  T(C): 37.2 (19 Jul 2019 05:16), Max: 37.2 (19 Jul 2019 05:16)  T(F): 98.9 (19 Jul 2019 05:16), Max: 98.9 (19 Jul 2019 05:16)  HR: 70 (19 Jul 2019 05:16) (55 - 76)  BP: 142/63 (19 Jul 2019 05:16) (114/68 - 163/92)  BP(mean): --  RR: 17 (19 Jul 2019 05:16) (16 - 20)  SpO2: 98% (19 Jul 2019 05:16) (98% - 100%)  I&O's Detail    18 Jul 2019 07:01  -  19 Jul 2019 07:00  --------------------------------------------------------  IN:    lactated ringers.: 1000 mL  Total IN: 1000 mL    OUT:    Voided: 400 mL  Total OUT: 400 mL    Total NET: 600 mL          Physical Exam:    General: AAOx3, in NAD, resting comfortably in bed.    R knee:  In nl. alignment. Dressing is C/D/I.  Calves are soft, non-tender. 2+pulses. NVI.                          11.6   9.92  )-----------( 286      ( 19 Jul 2019 06:13 )             37.9     07-19    136  |  102  |  11  ----------------------------<  127<H>  3.7   |  28  |  0.74    Ca    8.6      19 Jul 2019 06:13        Impression:  59yFemale S/p R total knee replacement POD# 1  Plan:  -  Continue pain management  -  DVT prophylaxis with Lovenox  -  Daily Physical Therapy:  WBAT on RLE with walker  -  Discharge planning: Home Vs. Rehab pending Physical therapy eval.  -  Continue Antibiotics x 24hrs post-op.  -  Encouraged use of incentive spirometer  -  Case d/w

## 2019-07-22 LAB — SURGICAL PATHOLOGY STUDY: SIGNIFICANT CHANGE UP

## 2020-05-13 NOTE — PATIENT PROFILE ADULT - NSPROPTRIGHTSUPPORTPERSON_GEN_A_NUR
From: Magnolia Damon  To: Laura Coulter MD  Sent: 5/13/2020 1:55 PM CDT  Subject: Medication Question    Norma,    The pharmacist informed me that the ER release comes in the 300ER and 450ER strength not the 150. Therefore it would have to be the 450 ER prescription.    Thanks,  Magnolia  
Previous rx d/c from med list new rx sent to pharmacy for li 450 mg CR    Message sent to pt   
same name as above

## 2022-06-01 NOTE — BRIEF OPERATIVE NOTE - CO SURGEON
dr nguyễn Detail Level: Detailed Quality 130: Documentation Of Current Medications In The Medical Record: Current Medications Documented

## 2023-02-14 NOTE — H&P PST ADULT - NS PRO LAST MENSTRUAL DATE
May 2019 Cimetidine Counseling:  I discussed with the patient the risks of Cimetidine including but not limited to gynecomastia, headache, diarrhea, nausea, drowsiness, arrhythmias, pancreatitis, skin rashes, psychosis, bone marrow suppression and kidney toxicity.

## 2023-05-23 NOTE — ASU PREOP CHECKLIST - LOOSE TEETH
Patient: Sebastien Mchugh  MR#: [id number]  Date of Note: 2023  Location: [facility]  : 1965 Sex: male  Physician: Clari Diaz MD    PROGRESS NOTE    PRIMARY CARE DOCTOR:  Brian Rodriguez MD     CHIEF COMPLAINT:  abd pain    HISTORY OF PRESENT ILLNESS:  I had the pleasure of seeing Mr. Mchugh again in clinic today. He is a pleasant 57 year old-year-old male who presents for a follow up visit regarding is chronic ongoing right sided abdominal pain.  Since I last saw him I ordered a small bowel follow thru which appeared normal.  He tried miralax for a few weeks with moderate improvement of his symptoms but then the pain recurred.  He has a hx of dumping syndrome from prior gastrectomy which he states is not truly related to his pain.    No incisional concerns.       PHYSICAL EXAMINATION:  VITAL SIGNS:   Visit Vitals  BP (!) 125/92 (BP Location: LUE - Left upper extremity, Patient Position: Sitting, Cuff Size: Large Adult)   Pulse 73   Temp 97 °F (36.1 °C) (Oral)   Ht 5' 4\" (1.626 m)   Wt 72.3 kg (159 lb 6.3 oz)   BMI 27.36 kg/m²     GEN: NAD alert and oriented, well nourished  CV: RRR, s1s2, no murmurs gallops or rubs  RESP: bilateral breath sounds, no crackles, no rhonchi, no wheezes, equal excursion, no accessory muscle use  ABDOMEN: Nondistended. Soft. Bowel sounds x4.  Incisions are well healed.    SKIN: Warm      ASSESSMENT AND PLAN:  Mr. Mchugh is a pleasant 57 year old-year-old male who presents status post prior robotic AWR followed by open ashlie, then take back for internal hernia.    He has had chronic ongoing right sided abdominal pain for some weeks now.  We discussed again at length diagnostic laparoscopy vs exploratory laparotomy for lysis of adhesions.  We discussed the yield may be low and puts him at risk for future adhesions and abdominal pain moving forwards.  He will discuss with his family and follow up in a few weeks.    Thank you for allowing me to participate in  the care of your patient. It was a pleasure.    Sincerely,      Clari Diaz MD     no

## 2023-09-19 NOTE — H&P PST ADULT - CONSTITUTIONAL
Patient Name: Parker Acevedo  Medical Record Number: [id number]  Today's Date: 9/19/2023    Procedure: Tunneled line exchange  Proceduralist: Dr. Fong, Dr. Wren, Dr. Nieves  Pathology present: no    Procedure Start: 1610  Procedure end: 1650  Sedation medications administered: Local lidocaine     Report given to:  RN  : no    Other Notes: Pt arrived to IR room 2 from . Consent reviewed. Pt denies any questions or concerns regarding procedure. Pt positioned supine and monitored per protocol.     Pt tolerated procedure without any noted complications.     Line placed under image guidance and ok for immediate use.  Both lumens flushed with 2.5 mls of 10 units/ml of Heparin.    Pt transferred back to .     Pt has Fentanyl patch on R arm   detailed exam

## 2024-10-02 PROBLEM — E66.9 OBESITY, UNSPECIFIED: Chronic | Status: ACTIVE | Noted: 2019-07-11

## 2024-10-02 PROBLEM — J45.909 UNSPECIFIED ASTHMA, UNCOMPLICATED: Chronic | Status: ACTIVE | Noted: 2019-07-11

## 2024-10-02 PROBLEM — R73.03 PREDIABETES: Chronic | Status: ACTIVE | Noted: 2019-07-11

## 2024-10-02 PROBLEM — I25.10 ATHEROSCLEROTIC HEART DISEASE OF NATIVE CORONARY ARTERY WITHOUT ANGINA PECTORIS: Chronic | Status: ACTIVE | Noted: 2019-07-11

## 2024-10-02 PROBLEM — E78.5 HYPERLIPIDEMIA, UNSPECIFIED: Chronic | Status: ACTIVE | Noted: 2019-07-11

## 2024-10-02 PROBLEM — I10 ESSENTIAL (PRIMARY) HYPERTENSION: Chronic | Status: ACTIVE | Noted: 2019-07-11

## 2024-10-07 ENCOUNTER — OUTPATIENT (OUTPATIENT)
Dept: OUTPATIENT SERVICES | Facility: HOSPITAL | Age: 64
LOS: 1 days | End: 2024-10-07
Payer: COMMERCIAL

## 2024-10-07 VITALS
TEMPERATURE: 98 F | HEART RATE: 78 BPM | DIASTOLIC BLOOD PRESSURE: 80 MMHG | HEIGHT: 64 IN | WEIGHT: 210.1 LBS | OXYGEN SATURATION: 98 % | SYSTOLIC BLOOD PRESSURE: 164 MMHG | RESPIRATION RATE: 16 BRPM

## 2024-10-07 DIAGNOSIS — I10 ESSENTIAL (PRIMARY) HYPERTENSION: ICD-10-CM

## 2024-10-07 DIAGNOSIS — J45.909 UNSPECIFIED ASTHMA, UNCOMPLICATED: ICD-10-CM

## 2024-10-07 DIAGNOSIS — Z98.51 TUBAL LIGATION STATUS: Chronic | ICD-10-CM

## 2024-10-07 DIAGNOSIS — E11.9 TYPE 2 DIABETES MELLITUS WITHOUT COMPLICATIONS: ICD-10-CM

## 2024-10-07 DIAGNOSIS — M17.12 UNILATERAL PRIMARY OSTEOARTHRITIS, LEFT KNEE: ICD-10-CM

## 2024-10-07 DIAGNOSIS — Z01.818 ENCOUNTER FOR OTHER PREPROCEDURAL EXAMINATION: ICD-10-CM

## 2024-10-07 DIAGNOSIS — Z96.651 PRESENCE OF RIGHT ARTIFICIAL KNEE JOINT: Chronic | ICD-10-CM

## 2024-10-07 LAB — BLD GP AB SCN SERPL QL: SIGNIFICANT CHANGE UP

## 2024-10-07 NOTE — H&P PST ADULT - NSICDXPASTMEDICALHX_GEN_ALL_CORE_FT
PAST MEDICAL HISTORY:  Asthma     CAD (coronary artery disease)     Hyperlipidemia     Hypertension     Obesity     Prediabetes      PAST MEDICAL HISTORY:  Asthma     CAD (coronary artery disease)     Hyperlipidemia     Hypertension     Obesity     Osteoarthritis     Prediabetes

## 2024-10-07 NOTE — H&P PST ADULT - ASSESSMENT
64 y.o female with PMHx for Asthma, CAD, HLD, HTN, Obesity, DM, Osteoarthritis right knee s/p Right Total Knee replacement 2019. Now with c/o of worsening left knee pain. On w/u found to have Unilateral Primary Osteoarthritis left knee and now for schedule Left Knee Replacement on 10/10/24 with Dr Lee  64 y.o female with  Unilateral Primary Osteoarthritis left knee and now for schedule Left Knee Replacement on 10/10/24 with Dr Jesus Lee

## 2024-10-07 NOTE — H&P PST ADULT - MUSCULOSKELETAL
details… decreased ROM/decreased ROM due to pain/strength 5/5 bilateral lower extremities decreased ROM/decreased ROM due to pain/strength 5/5 bilateral upper extremities

## 2024-10-07 NOTE — H&P PST ADULT - HISTORY OF PRESENT ILLNESS
64 y.o female with PMHx for Asthma, CAD, HLD, HTN, Obesity, DM, Osteoarthritis right knee s/p Right Total Knee replacement 2019. Now with c/o of worsening left knee pain. On w/u found to have Unilateral Primary Osteoarthritis left knee and now for schedule Left Knee Replacement on 10/10/24 with Dr Lee  64 y.o female with PMHx for Asthma,  HLD, HTN, Obesity, Osteoarthritis right knee s/p Right Total Knee replacement 2019. Now with c/o of worsening left knee pain. On w/u found to have Unilateral Primary Osteoarthritis left knee and now for schedule Left Knee Replacement on 10/10/24 with Dr Lee

## 2024-10-07 NOTE — H&P PST ADULT - PROBLEM SELECTOR PLAN 1
Pt schedule for Right Hip Replacement on 11/16/23 with Dr Lee    Labs drawn in PCP - will /fu report   Pt was seen by PCP for Medical clearance- will f/u report     pt was swabbed for MRSA/MSSA -will f/u with result   Pt Instructed on use of Mupirocin if nasal swab positive    Pt was  instructed to stop aspirin/ecotrin and all over the counter medication including vitamins and herbal supplements one week prior to surgery   Instructions given on the use of 4% chlorhexidine wash and Pt verbalized understanding of same   Pt Instructed to have nothing by mouth starting midnight day before surgery  Patient is to expect a phone call day before surgery between the hours of 430- 630pm giving arrival time for surgery   Written and verbal preoperative instructions given to patient with understanding verbalized.   Post surgery instruction given by Elie, booklets and pamphlets also given    Patient today with STOP bang score 3  Low  risk for JAYE Pt schedule for Right Hip Replacement on 11/16/23 with Dr Lee    Labs drawn in PCP - will /fu report   Pt was seen by PCP for Medical clearance- will f/u report     pt was swabbed for MRSA/MSSA -will f/u with result   Pt Instructed on use of Mupirocin if nasal swab positive    Pt was  instructed to stop aspirin/ecotrin and all over the counter medication including vitamins and herbal supplements one week prior to surgery   Instructions given on the use of 4% chlorhexidine wash and Pt verbalized understanding of same   Pt Instructed to have nothing by mouth starting midnight day before surgery  Patient is to expect a phone call day before surgery between the hours of 430- 630pm giving arrival time for surgery   Written and verbal preoperative instructions given to patient with understanding verbalized.   Post surgery instruction given by Elie, booklets and pamphlets also given    Patient today with STOP bang score 5  high  risk for JAYE

## 2024-10-08 LAB
A1C WITH ESTIMATED AVERAGE GLUCOSE RESULT: 6.1 % — HIGH (ref 4–5.6)
ESTIMATED AVERAGE GLUCOSE: 128 MG/DL — HIGH (ref 68–114)
MRSA PCR RESULT.: SIGNIFICANT CHANGE UP
S AUREUS DNA NOSE QL NAA+PROBE: SIGNIFICANT CHANGE UP

## 2024-10-08 PROCEDURE — 87640 STAPH A DNA AMP PROBE: CPT

## 2024-10-08 PROCEDURE — 36415 COLL VENOUS BLD VENIPUNCTURE: CPT

## 2024-10-08 PROCEDURE — G0463: CPT

## 2024-10-08 PROCEDURE — 86900 BLOOD TYPING SEROLOGIC ABO: CPT

## 2024-10-08 PROCEDURE — 83036 HEMOGLOBIN GLYCOSYLATED A1C: CPT

## 2024-10-08 PROCEDURE — 86901 BLOOD TYPING SEROLOGIC RH(D): CPT

## 2024-10-08 PROCEDURE — 86850 RBC ANTIBODY SCREEN: CPT

## 2024-10-08 PROCEDURE — 87641 MR-STAPH DNA AMP PROBE: CPT

## 2024-10-08 NOTE — CHART NOTE - NSCHARTNOTEFT_GEN_A_CORE
PRE-TJR SOCIAL/FUNCTIONAL SCREENING Via:     Telephone Call  10/08/2024     Preferred Language :English   Patient Telephone #: [phone]  EMAIL ADDRESS: [email or web address]   Insurance:  [id number]  Pt stated Goal for Surgery : " To get better and walk without pain in my knee."   SURGERY DETAILS:  Sx Date:  10/10 /2024                                                                                           Surgery Type:  Left   Knee Replacement. Pt had a Right TKA done in 2019  Surgeon Dr. Jesus NASHT Tool 11/12   Attitude towards SDD- Pt states feels nervous about going home same day, Pt was explained the process- understands will be Dced home after once cleared for DC by ORTHO and PT     SOCIAL HISTORY:  Live With alone    in a  House     Stairs Required to Access (Street to Front Door) Residence:    Yes; 2 Railing: Right     Once Inside Pt Residence:   To Access a Bathroom:      No Stairs Required     To Access a Bedroom Where Pt. Can Sleep:  No Stairs Required    Pt. Currently Has Formal Home Health Services:  No     MOBILITY HISTORY:   Baseline Ambulation- Pt is Independent in ambulation without assistive device  Pt. Owns Any Other Medical Equipment?  No, patient will need rolling walker and 3-in-1 commode      MEDICAL HISTORY:  Pt has any History of Back Surgery:   No   Pt has any Allergies:  No    Patient denies diagnosis of sleep apnea or use of CPAP    Pt. Pharmacy Information:  Name:  [facility] pt- received phone call from pharmacy awaiting to have meds delivered                   Address:          Telephone #:     Pt. will Require Transportation to Home Upon Discharge: No- daughter will  the patient once pt is cleared for DC by ORTHO and PT     For Post-Acute Care:  Pt. prefers [facility] for post-acute needs     Pt electronically sent pre- op education book "What to do before and after knee replacement".  All details of upcoming procedure discussed including, precautions, the throughput process, post-op process including transportation, home-care and follow-up as well as important information regarding blood clots, pneumonia, falls, infection and pain.  All questions answered and pt. verbalized understanding.  Pt. has Director of PT phone number for follow up as needed.

## 2024-10-10 ENCOUNTER — INPATIENT (INPATIENT)
Facility: HOSPITAL | Age: 64
LOS: 0 days | Discharge: HOME HEALTH | DRG: 554 | End: 2024-10-11
Attending: ORTHOPAEDIC SURGERY | Admitting: ORTHOPAEDIC SURGERY
Payer: COMMERCIAL

## 2024-10-10 VITALS
HEART RATE: 72 BPM | SYSTOLIC BLOOD PRESSURE: 130 MMHG | WEIGHT: 210.1 LBS | TEMPERATURE: 98 F | RESPIRATION RATE: 16 BRPM | OXYGEN SATURATION: 98 % | DIASTOLIC BLOOD PRESSURE: 76 MMHG | HEIGHT: 64 IN

## 2024-10-10 DIAGNOSIS — E78.5 HYPERLIPIDEMIA, UNSPECIFIED: ICD-10-CM

## 2024-10-10 DIAGNOSIS — M17.12 UNILATERAL PRIMARY OSTEOARTHRITIS, LEFT KNEE: ICD-10-CM

## 2024-10-10 DIAGNOSIS — I25.10 ATHEROSCLEROTIC HEART DISEASE OF NATIVE CORONARY ARTERY WITHOUT ANGINA PECTORIS: ICD-10-CM

## 2024-10-10 DIAGNOSIS — Z96.651 PRESENCE OF RIGHT ARTIFICIAL KNEE JOINT: Chronic | ICD-10-CM

## 2024-10-10 DIAGNOSIS — Z01.818 ENCOUNTER FOR OTHER PREPROCEDURAL EXAMINATION: ICD-10-CM

## 2024-10-10 DIAGNOSIS — G89.18 OTHER ACUTE POSTPROCEDURAL PAIN: ICD-10-CM

## 2024-10-10 DIAGNOSIS — Z96.652 PRESENCE OF LEFT ARTIFICIAL KNEE JOINT: ICD-10-CM

## 2024-10-10 DIAGNOSIS — J45.909 UNSPECIFIED ASTHMA, UNCOMPLICATED: ICD-10-CM

## 2024-10-10 DIAGNOSIS — Z98.51 TUBAL LIGATION STATUS: Chronic | ICD-10-CM

## 2024-10-10 DIAGNOSIS — R73.03 PREDIABETES: ICD-10-CM

## 2024-10-10 DIAGNOSIS — I10 ESSENTIAL (PRIMARY) HYPERTENSION: ICD-10-CM

## 2024-10-10 LAB
ANION GAP SERPL CALC-SCNC: 7 MMOL/L — SIGNIFICANT CHANGE UP (ref 5–17)
BLD GP AB SCN SERPL QL: SIGNIFICANT CHANGE UP
BUN SERPL-MCNC: 12 MG/DL — SIGNIFICANT CHANGE UP (ref 7–18)
CALCIUM SERPL-MCNC: 9.5 MG/DL — SIGNIFICANT CHANGE UP (ref 8.4–10.5)
CHLORIDE SERPL-SCNC: 100 MMOL/L — SIGNIFICANT CHANGE UP (ref 96–108)
CO2 SERPL-SCNC: 27 MMOL/L — SIGNIFICANT CHANGE UP (ref 22–31)
CREAT SERPL-MCNC: 0.78 MG/DL — SIGNIFICANT CHANGE UP (ref 0.5–1.3)
EGFR: 85 ML/MIN/1.73M2 — SIGNIFICANT CHANGE UP
GLUCOSE BLDC GLUCOMTR-MCNC: 143 MG/DL — HIGH (ref 70–99)
GLUCOSE BLDC GLUCOMTR-MCNC: 172 MG/DL — HIGH (ref 70–99)
GLUCOSE SERPL-MCNC: 180 MG/DL — HIGH (ref 70–99)
HCT VFR BLD CALC: 38.2 % — SIGNIFICANT CHANGE UP (ref 34.5–45)
HGB BLD-MCNC: 12.3 G/DL — SIGNIFICANT CHANGE UP (ref 11.5–15.5)
MCHC RBC-ENTMCNC: 27 PG — SIGNIFICANT CHANGE UP (ref 27–34)
MCHC RBC-ENTMCNC: 32.2 GM/DL — SIGNIFICANT CHANGE UP (ref 32–36)
MCV RBC AUTO: 83.8 FL — SIGNIFICANT CHANGE UP (ref 80–100)
NRBC # BLD: 0 /100 WBCS — SIGNIFICANT CHANGE UP (ref 0–0)
PLATELET # BLD AUTO: 353 K/UL — SIGNIFICANT CHANGE UP (ref 150–400)
POTASSIUM SERPL-MCNC: 3.3 MMOL/L — LOW (ref 3.5–5.3)
POTASSIUM SERPL-SCNC: 3.3 MMOL/L — LOW (ref 3.5–5.3)
RBC # BLD: 4.56 M/UL — SIGNIFICANT CHANGE UP (ref 3.8–5.2)
RBC # FLD: 12.4 % — SIGNIFICANT CHANGE UP (ref 10.3–14.5)
SODIUM SERPL-SCNC: 134 MMOL/L — LOW (ref 135–145)
WBC # BLD: 9.81 K/UL — SIGNIFICANT CHANGE UP (ref 3.8–10.5)
WBC # FLD AUTO: 9.81 K/UL — SIGNIFICANT CHANGE UP (ref 3.8–10.5)

## 2024-10-10 PROCEDURE — 73560 X-RAY EXAM OF KNEE 1 OR 2: CPT | Mod: 26,LT

## 2024-10-10 PROCEDURE — 99222 1ST HOSP IP/OBS MODERATE 55: CPT

## 2024-10-10 DEVICE — INSERT TIB BEARING PS X3 SZ 3 9MM STRL: Type: IMPLANTABLE DEVICE | Status: FUNCTIONAL

## 2024-10-10 DEVICE — CEMENT SIMPLEX WITH TOBRAMYCIN: Type: IMPLANTABLE DEVICE | Status: FUNCTIONAL

## 2024-10-10 DEVICE — STRYKER PIN 1/8 X 3.5" LONG: Type: IMPLANTABLE DEVICE | Status: FUNCTIONAL

## 2024-10-10 DEVICE — COMP FEM TRIATHLON PS SZ 4 LT: Type: IMPLANTABLE DEVICE | Status: FUNCTIONAL

## 2024-10-10 DEVICE — BASEPLATE TIB UNIV TRIATHLON SZ 3: Type: IMPLANTABLE DEVICE | Status: FUNCTIONAL

## 2024-10-10 DEVICE — COMP PATELLA ASYMMETRIC X3 32X10MM: Type: IMPLANTABLE DEVICE | Status: FUNCTIONAL

## 2024-10-10 RX ORDER — HYDROMORPHONE HYDROCHLORIDE 1 MG/ML
2 INJECTION, SOLUTION INTRAMUSCULAR; INTRAVENOUS; SUBCUTANEOUS
Refills: 0 | Status: DISCONTINUED | OUTPATIENT
Start: 2024-10-10 | End: 2024-10-10

## 2024-10-10 RX ORDER — ALBUTEROL 90 MCG
2 AEROSOL (GRAM) INHALATION EVERY 6 HOURS
Refills: 0 | Status: DISCONTINUED | OUTPATIENT
Start: 2024-10-10 | End: 2024-10-10

## 2024-10-10 RX ORDER — KETOROLAC TROMETHAMINE 10 MG/1
30 TABLET, FILM COATED ORAL EVERY 6 HOURS
Refills: 0 | Status: DISCONTINUED | OUTPATIENT
Start: 2024-10-10 | End: 2024-10-11

## 2024-10-10 RX ORDER — CELECOXIB 200 MG/1
200 CAPSULE ORAL ONCE
Refills: 0 | Status: COMPLETED | OUTPATIENT
Start: 2024-10-10 | End: 2024-10-10

## 2024-10-10 RX ORDER — ACETAMINOPHEN 325 MG
975 TABLET ORAL ONCE
Refills: 0 | Status: COMPLETED | OUTPATIENT
Start: 2024-10-10 | End: 2024-10-10

## 2024-10-10 RX ORDER — AMLODIPINE BESYLATE 5 MG
1 TABLET ORAL
Refills: 0 | DISCHARGE

## 2024-10-10 RX ORDER — TRAMADOL HYDROCHLORIDE 50 MG/1
50 TABLET, COATED ORAL EVERY 8 HOURS
Refills: 0 | Status: DISCONTINUED | OUTPATIENT
Start: 2024-10-10 | End: 2024-10-11

## 2024-10-10 RX ORDER — SENNOSIDES 8.6 MG
2 TABLET ORAL AT BEDTIME
Refills: 0 | Status: DISCONTINUED | OUTPATIENT
Start: 2024-10-10 | End: 2024-10-11

## 2024-10-10 RX ORDER — ONDANSETRON HCL/PF 4 MG/2 ML
4 VIAL (ML) INJECTION ONCE
Refills: 0 | Status: DISCONTINUED | OUTPATIENT
Start: 2024-10-10 | End: 2024-10-10

## 2024-10-10 RX ORDER — PANTOPRAZOLE SODIUM 40 MG/1
40 TABLET, DELAYED RELEASE ORAL
Refills: 0 | Status: DISCONTINUED | OUTPATIENT
Start: 2024-10-10 | End: 2024-10-11

## 2024-10-10 RX ORDER — TRAMADOL HYDROCHLORIDE 50 MG/1
50 TABLET, COATED ORAL ONCE
Refills: 0 | Status: DISCONTINUED | OUTPATIENT
Start: 2024-10-10 | End: 2024-10-10

## 2024-10-10 RX ORDER — CHLORHEXIDINE GLUCONATE ORAL RINSE 1.2 MG/ML
1 SOLUTION DENTAL ONCE
Refills: 0 | Status: COMPLETED | OUTPATIENT
Start: 2024-10-10 | End: 2024-10-10

## 2024-10-10 RX ORDER — MAGNESIUM HYDROXIDE 400 MG/5ML
30 SUSPENSION, ORAL (FINAL DOSE FORM) ORAL DAILY
Refills: 0 | Status: DISCONTINUED | OUTPATIENT
Start: 2024-10-10 | End: 2024-10-11

## 2024-10-10 RX ORDER — ONDANSETRON HCL/PF 4 MG/2 ML
4 VIAL (ML) INJECTION EVERY 6 HOURS
Refills: 0 | Status: DISCONTINUED | OUTPATIENT
Start: 2024-10-10 | End: 2024-10-11

## 2024-10-10 RX ORDER — OXYCODONE HYDROCHLORIDE 30 MG/1
10 TABLET, FILM COATED, EXTENDED RELEASE ORAL ONCE
Refills: 0 | Status: DISCONTINUED | OUTPATIENT
Start: 2024-10-10 | End: 2024-10-10

## 2024-10-10 RX ORDER — CEFAZOLIN SODIUM 1 G
2000 VIAL (EA) INJECTION EVERY 8 HOURS
Refills: 0 | Status: COMPLETED | OUTPATIENT
Start: 2024-10-10 | End: 2024-10-11

## 2024-10-10 RX ORDER — CELECOXIB 200 MG/1
200 CAPSULE ORAL EVERY 24 HOURS
Refills: 0 | Status: DISCONTINUED | OUTPATIENT
Start: 2024-10-11 | End: 2024-10-11

## 2024-10-10 RX ORDER — HYDROMORPHONE HYDROCHLORIDE 1 MG/ML
1 INJECTION, SOLUTION INTRAMUSCULAR; INTRAVENOUS; SUBCUTANEOUS
Refills: 0 | Status: DISCONTINUED | OUTPATIENT
Start: 2024-10-10 | End: 2024-10-10

## 2024-10-10 RX ORDER — SODIUM CHLORIDE IRRIG SOLUTION 0.9 %
1000 SOLUTION, IRRIGATION IRRIGATION
Refills: 0 | Status: DISCONTINUED | OUTPATIENT
Start: 2024-10-10 | End: 2024-10-10

## 2024-10-10 RX ORDER — OXYCODONE HYDROCHLORIDE 30 MG/1
5 TABLET, FILM COATED, EXTENDED RELEASE ORAL EVERY 4 HOURS
Refills: 0 | Status: DISCONTINUED | OUTPATIENT
Start: 2024-10-10 | End: 2024-10-11

## 2024-10-10 RX ORDER — METOPROLOL TARTRATE 50 MG
1 TABLET ORAL
Refills: 0 | DISCHARGE

## 2024-10-10 RX ORDER — SODIUM CHLORIDE 0.9 % (FLUSH) 0.9 %
3 SYRINGE (ML) INJECTION EVERY 8 HOURS
Refills: 0 | Status: DISCONTINUED | OUTPATIENT
Start: 2024-10-10 | End: 2024-10-11

## 2024-10-10 RX ORDER — HYDROCHLOROTHIAZIDE 50 MG/1
1 TABLET ORAL
Refills: 0 | DISCHARGE

## 2024-10-10 RX ORDER — SODIUM CHLORIDE 0.9 % (FLUSH) 0.9 %
1000 SYRINGE (ML) INJECTION
Refills: 0 | Status: DISCONTINUED | OUTPATIENT
Start: 2024-10-10 | End: 2024-10-11

## 2024-10-10 RX ORDER — METOPROLOL TARTRATE 50 MG
50 TABLET ORAL DAILY
Refills: 0 | Status: DISCONTINUED | OUTPATIENT
Start: 2024-10-10 | End: 2024-10-10

## 2024-10-10 RX ORDER — ACETAMINOPHEN 325 MG
1000 TABLET ORAL EVERY 6 HOURS
Refills: 0 | Status: COMPLETED | OUTPATIENT
Start: 2024-10-10 | End: 2024-10-11

## 2024-10-10 RX ORDER — ENOXAPARIN SODIUM 150 MG/ML
30 INJECTION SUBCUTANEOUS EVERY 12 HOURS
Refills: 0 | Status: DISCONTINUED | OUTPATIENT
Start: 2024-10-10 | End: 2024-10-11

## 2024-10-10 RX ORDER — FERROUS SULFATE 325(65) MG
325 TABLET ORAL DAILY
Refills: 0 | Status: DISCONTINUED | OUTPATIENT
Start: 2024-10-10 | End: 2024-10-11

## 2024-10-10 RX ORDER — AMLODIPINE BESYLATE 5 MG
5 TABLET ORAL DAILY
Refills: 0 | Status: DISCONTINUED | OUTPATIENT
Start: 2024-10-10 | End: 2024-10-10

## 2024-10-10 RX ADMIN — OXYCODONE HYDROCHLORIDE 5 MILLIGRAM(S): 30 TABLET, FILM COATED, EXTENDED RELEASE ORAL at 16:30

## 2024-10-10 RX ADMIN — TRAMADOL HYDROCHLORIDE 50 MILLIGRAM(S): 50 TABLET, COATED ORAL at 21:45

## 2024-10-10 RX ADMIN — Medication 3 MILLILITER(S): at 21:43

## 2024-10-10 RX ADMIN — Medication 100 MILLIGRAM(S): at 18:58

## 2024-10-10 RX ADMIN — OXYCODONE HYDROCHLORIDE 5 MILLIGRAM(S): 30 TABLET, FILM COATED, EXTENDED RELEASE ORAL at 15:51

## 2024-10-10 RX ADMIN — CELECOXIB 200 MILLIGRAM(S): 200 CAPSULE ORAL at 09:16

## 2024-10-10 RX ADMIN — Medication 1000 MILLIGRAM(S): at 23:31

## 2024-10-10 RX ADMIN — Medication 975 MILLIGRAM(S): at 09:17

## 2024-10-10 RX ADMIN — ENOXAPARIN SODIUM 30 MILLIGRAM(S): 150 INJECTION SUBCUTANEOUS at 19:58

## 2024-10-10 RX ADMIN — CHLORHEXIDINE GLUCONATE ORAL RINSE 1 APPLICATION(S): 1.2 SOLUTION DENTAL at 09:16

## 2024-10-10 RX ADMIN — Medication 1000 MILLIGRAM(S): at 18:58

## 2024-10-10 RX ADMIN — Medication 120 MILLILITER(S): at 15:51

## 2024-10-10 RX ADMIN — Medication 17 GRAM(S): at 21:45

## 2024-10-10 RX ADMIN — Medication 1000 MILLIGRAM(S): at 19:30

## 2024-10-10 RX ADMIN — TRAMADOL HYDROCHLORIDE 50 MILLIGRAM(S): 50 TABLET, COATED ORAL at 09:16

## 2024-10-10 RX ADMIN — Medication 2 TABLET(S): at 21:45

## 2024-10-10 NOTE — PHYSICAL THERAPY INITIAL EVALUATION ADULT - DIAGNOSIS, PT EVAL
(ICF Model) Pt. present w/deficits in Body Structures/Function (Impairments), incl: Strength, Balance, ROM, Pain leading to deficits in performing the below noted Activities (Limitations).

## 2024-10-10 NOTE — ASU PATIENT PROFILE, ADULT - NSICDXPASTMEDICALHX_GEN_ALL_CORE_FT
PAST MEDICAL HISTORY:  Asthma     CAD (coronary artery disease)     Hyperlipidemia     Hypertension     Obesity     Osteoarthritis     Prediabetes

## 2024-10-10 NOTE — PHYSICAL THERAPY INITIAL EVALUATION ADULT - MANUAL MUSCLE TESTING RESULTS, REHAB EVAL
B UE + R LE >4/5. L LE limited assessment due to bandage. L LE ~ 3-/5 in all motions./grossly assessed due to

## 2024-10-10 NOTE — PHYSICAL THERAPY INITIAL EVALUATION ADULT - RANGE OF MOTION EXAMINATION, REHAB EVAL
L LE ~(5) degrees ext and 15 degrees of flexion/bilateral upper extremity ROM was WNL (within normal limits)

## 2024-10-10 NOTE — CONSULT NOTE ADULT - SUBJECTIVE AND OBJECTIVE BOX
Source of information: ALEXANDER PACHECO, Chart review  Patient language: English  : n/a    HPI:  Patient is a 64 year old female with a PMHs significant of obesity, prediabetes, HLD, CAD, HTN, asthma, and OA of left knee. Patient is admitted for scheduled Left Total Knee Replacement with Dr. Narvaez on 10/10/24, now POD 0. Pain service consulted 10/10 for acute postoperative pain. Pt seen and examined at bedside, this afternoon. At time of assessment, patient laying in bed in NAD, reports pain score 6/10, primary RN notified to administer PRN pain medication as ordered SCALE USED: (1-10 VNRS). Pt describes pain as localized to left knee joint, constant and throbbing in quality. The pain is currently exacerbated by movement. Pt tolerating sips of water. Denies lethargy, chest pain, SOB, nausea, vomiting, constipation. Reports last BM 10/10. Patient stated goal for pain control: to be able to take deep breaths, get out of bed to chair and ambulate with tolerable pain control. Pt reports taking Acetaminophen for pain at home and ambulates independently at baseline.    PAST MEDICAL & SURGICAL HISTORY:  Hypertension    Asthma    CAD (coronary artery disease)    Hyperlipidemia    Prediabetes    Obesity    Osteoarthritis    History of tubal ligation    H/O total knee replacement, right    FAMILY HISTORY:  Family history of diabetes mellitus    Family hx of hypertension    Social History:  [x] Denies ETOH use, illicit drug use and smoking    Allergies    No Known Allergies    Intolerances    MEDICATIONS  (STANDING):  acetaminophen     Tablet .. 1000 milliGRAM(s) Oral every 6 hours  ceFAZolin   IVPB 2000 milliGRAM(s) IV Intermittent every 8 hours  enoxaparin Injectable 30 milliGRAM(s) SubCutaneous every 12 hours  ferrous    sulfate 325 milliGRAM(s) Oral daily  pantoprazole    Tablet 40 milliGRAM(s) Oral before breakfast  polyethylene glycol 3350 17 Gram(s) Oral at bedtime  senna 2 Tablet(s) Oral at bedtime  sodium chloride 0.9% lock flush 3 milliLiter(s) IV Push every 8 hours  sodium chloride 0.9%. 1000 milliLiter(s) (120 mL/Hr) IV Continuous <Continuous>  traMADol 50 milliGRAM(s) Oral every 8 hours    MEDICATIONS  (PRN):  ketorolac   Injectable 30 milliGRAM(s) IV Push every 6 hours PRN Severe Pain (7 - 10)  magnesium hydroxide Suspension 30 milliLiter(s) Oral daily PRN Constipation  ondansetron Injectable 4 milliGRAM(s) IV Push every 6 hours PRN Nausea and/or Vomiting  oxyCODONE    IR 5 milliGRAM(s) Oral every 4 hours PRN Moderate Pain (4 - 6)    Vital Signs Last 24 Hrs  T(C): 36.4 (10 Oct 2024 14:59), Max: 36.9 (10 Oct 2024 09:03)  T(F): 97.5 (10 Oct 2024 14:59), Max: 98.4 (10 Oct 2024 09:03)  HR: 72 (10 Oct 2024 14:59) (62 - 72)  BP: 133/81 (10 Oct 2024 14:59) (112/64 - 151/123)  BP(mean): 73 (10 Oct 2024 13:30) (73 - 134)  RR: 16 (10 Oct 2024 14:59) (13 - 23)  SpO2: 93% (10 Oct 2024 14:59) (93% - 100%)    Parameters below as of 10 Oct 2024 14:59  Patient On (Oxygen Delivery Method): room air    LABS: Reviewed.                          12.3   9.81  )-----------( 353      ( 10 Oct 2024 12:13 )             38.2     10-10    134[L]  |  100  |  12  ----------------------------<  180[H]  3.3[L]   |  27  |  0.78    Ca    9.5      10 Oct 2024 12:13    Urinalysis Basic - ( 10 Oct 2024 12:13 )    Color: x / Appearance: x / SG: x / pH: x  Gluc: 180 mg/dL / Ketone: x  / Bili: x / Urobili: x   Blood: x / Protein: x / Nitrite: x   Leuk Esterase: x / RBC: x / WBC x   Sq Epi: x / Non Sq Epi: x / Bacteria: x    CAPILLARY BLOOD GLUCOSE    POCT Blood Glucose.: 172 mg/dL (10 Oct 2024 12:16)  POCT Blood Glucose.: 143 mg/dL (10 Oct 2024 09:21)    Radiology: None to Review.     ORT Score -   Family Hx of substance abuse	Female	      Male  Alcohol 	                                           1                     3  Illegal drugs	                                   2                     3  Rx drugs                                           4 	                  4  Personal Hx of substance abuse		  Alcohol 	                                          3	                  3  Illegal drugs                                     4	                  4  Rx drugs                                            5 	                  5  Age between 16- 45 years	           1                     1  hx preadolescent sexual abuse	   3 	                  0  Psychological disease		  ADD, OCD, bipolar, schizophrenia   2	          2  Depression                                           1 	          1  Total: 0    a score of 3 or lower indicates low risk for opioid abuse		  a score of 4-7 indicates moderate risk for opioid abuse		  a score of 8 or higher indicates high risk for opioid abuse  	  REVIEW OF SYSTEMS:  CONSTITUTIONAL: No fever or fatigue  HEENT:  No difficulty hearing, no change in vision  NECK: No pain or stiffness  RESPIRATORY: No cough, wheezing, chills or hemoptysis; No shortness of breath  CARDIOVASCULAR: No chest pain, palpitations, dizziness, or leg swelling  GASTROINTESTINAL: Tolerating sips of water. No abdominal or epigastric pain. No nausea, vomiting; No diarrhea or constipation.   GENITOURINARY: No dysuria, frequency, hematuria, retention or incontinence  MUSCULOSKELETAL: + Left knee pain; No back pain, no upper or lower motor strength weakness, no saddle anesthesia, bowel/bladder incontinence, no falls   NEURO: No headaches, No numbness/tingling b/l LE  ENDOCRINE: No polyuria, polydipsia, heat or cold intolerance; No hair loss  PSYCHIATRIC: No depression, anxiety or difficulty sleeping    PHYSICAL EXAM:  GENERAL:  Alert & Oriented X4, cooperative, NAD, Good concentration. Speech is clear.   RESPIRATORY: Respirations even and unlabored. Clear to auscultation bilaterally  CARDIOVASCULAR: Normal S1/S2, regular rate and rhythm  GASTROINTESTINAL:  Soft, Nontender, Nondistended; Bowel sounds present  PERIPHERAL VASCULAR:  Extremities warm without edema. 2+ Peripheral Pulses, No cyanosis, No calf tenderness  MUSCULOSKELETAL: Motor Strength 5/5 B/L upper and lower extremities; moves all extremities equally against gravity; decreased LLE ROM due to pain; negative SLR; + left knee tenderness on palpation     Risk factors associated with adverse outcomes related to opioid treatment  [ ] Concurrent benzodiazepine use  [ ] History/ Active substance use or alcohol use disorder  [ ] Psychiatric co-morbidity  [ ] Sleep apnea  [ ] COPD  [ ] BMI> 35  [ ] Liver dysfunction  [ ] Renal dysfunction  [ ] CHF  [ ] Smoker  [x] Age > 60 years    [x]  [place]  Reviewed and Copied to Chart. See below.    Plan of care and goal oriented pain management treatment options were discussed with patient and /or primary care giver; all questions and concerns were addressed and care was aligned with patient's wishes.    Educated patient on goal oriented pain management treatment options      Source of information: ALEXANDER PACHECO, Chart review  Patient language: English  : n/a    HPI:  Patient is a 64 year old female with a PMHs significant of obesity, prediabetes, HLD, CAD, HTN, asthma, and OA of left knee. Patient is admitted for scheduled Left Total Knee Replacement with Dr. Narvaez on 10/10/24, now POD 0. Pain service consulted 10/10 for acute postoperative pain. Pt seen and examined at bedside, this afternoon. At time of assessment, patient laying in bed in NAD, reports pain score 6/10, primary RN notified to administer PRN pain medication as ordered SCALE USED: (1-10 VNRS). Pt describes pain as localized to left knee joint, constant and throbbing in quality. The pain is currently exacerbated by movement. Pt tolerating sips of water. Denies lethargy, chest pain, SOB, nausea, vomiting, constipation. Reports last BM 10/10. Patient stated goal for pain control: to be able to take deep breaths, get out of bed to chair and ambulate with tolerable pain control. Pt reports taking Acetaminophen for pain at home and ambulates independently at baseline.    PAST MEDICAL & SURGICAL HISTORY:  Hypertension    Asthma    CAD (coronary artery disease)    Hyperlipidemia    Prediabetes    Obesity    Osteoarthritis    History of tubal ligation    H/O total knee replacement, right    FAMILY HISTORY:  Family history of diabetes mellitus    Family hx of hypertension    Social History:  [x] Denies ETOH use, illicit drug use and smoking    Allergies    No Known Allergies    Intolerances    MEDICATIONS  (STANDING):  acetaminophen     Tablet .. 1000 milliGRAM(s) Oral every 6 hours  ceFAZolin   IVPB 2000 milliGRAM(s) IV Intermittent every 8 hours  enoxaparin Injectable 30 milliGRAM(s) SubCutaneous every 12 hours  ferrous    sulfate 325 milliGRAM(s) Oral daily  pantoprazole    Tablet 40 milliGRAM(s) Oral before breakfast  polyethylene glycol 3350 17 Gram(s) Oral at bedtime  senna 2 Tablet(s) Oral at bedtime  sodium chloride 0.9% lock flush 3 milliLiter(s) IV Push every 8 hours  sodium chloride 0.9%. 1000 milliLiter(s) (120 mL/Hr) IV Continuous <Continuous>  traMADol 50 milliGRAM(s) Oral every 8 hours    MEDICATIONS  (PRN):  ketorolac   Injectable 30 milliGRAM(s) IV Push every 6 hours PRN Severe Pain (7 - 10)  magnesium hydroxide Suspension 30 milliLiter(s) Oral daily PRN Constipation  ondansetron Injectable 4 milliGRAM(s) IV Push every 6 hours PRN Nausea and/or Vomiting  oxyCODONE    IR 5 milliGRAM(s) Oral every 4 hours PRN Moderate Pain (4 - 6)    Vital Signs Last 24 Hrs  T(C): 36.4 (10 Oct 2024 14:59), Max: 36.9 (10 Oct 2024 09:03)  T(F): 97.5 (10 Oct 2024 14:59), Max: 98.4 (10 Oct 2024 09:03)  HR: 72 (10 Oct 2024 14:59) (62 - 72)  BP: 133/81 (10 Oct 2024 14:59) (112/64 - 151/123)  BP(mean): 73 (10 Oct 2024 13:30) (73 - 134)  RR: 16 (10 Oct 2024 14:59) (13 - 23)  SpO2: 93% (10 Oct 2024 14:59) (93% - 100%)    Parameters below as of 10 Oct 2024 14:59  Patient On (Oxygen Delivery Method): room air    LABS: Reviewed.                          12.3   9.81  )-----------( 353      ( 10 Oct 2024 12:13 )             38.2     10-10    134[L]  |  100  |  12  ----------------------------<  180[H]  3.3[L]   |  27  |  0.78    Ca    9.5      10 Oct 2024 12:13    Urinalysis Basic - ( 10 Oct 2024 12:13 )    Color: x / Appearance: x / SG: x / pH: x  Gluc: 180 mg/dL / Ketone: x  / Bili: x / Urobili: x   Blood: x / Protein: x / Nitrite: x   Leuk Esterase: x / RBC: x / WBC x   Sq Epi: x / Non Sq Epi: x / Bacteria: x    CAPILLARY BLOOD GLUCOSE    POCT Blood Glucose.: 172 mg/dL (10 Oct 2024 12:16)  POCT Blood Glucose.: 143 mg/dL (10 Oct 2024 09:21)    Radiology: None to Review.     ORT Score -   Family Hx of substance abuse	Female	      Male  Alcohol 	                                           1                     3  Illegal drugs	                                   2                     3  Rx drugs                                           4 	                  4  Personal Hx of substance abuse		  Alcohol 	                                          3	                  3  Illegal drugs                                     4	                  4  Rx drugs                                            5 	                  5  Age between 16- 45 years	           1                     1  hx preadolescent sexual abuse	   3 	                  0  Psychological disease		  ADD, OCD, bipolar, schizophrenia   2	          2  Depression                                           1 	          1  Total: 0    a score of 3 or lower indicates low risk for opioid abuse		  a score of 4-7 indicates moderate risk for opioid abuse		  a score of 8 or higher indicates high risk for opioid abuse  	  REVIEW OF SYSTEMS:  CONSTITUTIONAL: No fever or fatigue  HEENT:  No difficulty hearing, no change in vision  NECK: No pain or stiffness  RESPIRATORY: No cough, wheezing, chills or hemoptysis; No shortness of breath  CARDIOVASCULAR: No chest pain, palpitations, dizziness, or leg swelling  GASTROINTESTINAL: Tolerating sips of water. No abdominal or epigastric pain. No nausea, vomiting; No diarrhea or constipation.   GENITOURINARY: No dysuria, frequency, hematuria, retention or incontinence  MUSCULOSKELETAL: + Left knee pain; No back pain, no upper or lower motor strength weakness, no saddle anesthesia, bowel/bladder incontinence, no falls   NEURO: No headaches, No numbness/tingling b/l LE  ENDOCRINE: No polyuria, polydipsia, heat or cold intolerance; No hair loss  PSYCHIATRIC: No depression, anxiety or difficulty sleeping    PHYSICAL EXAM:  GENERAL:  Alert & Oriented X4, cooperative, NAD, Good concentration. Speech is clear.   RESPIRATORY: Respirations even and unlabored. Clear to auscultation bilaterally  CARDIOVASCULAR: Normal S1/S2, regular rate and rhythm  GASTROINTESTINAL:  Soft, Nontender, Nondistended; Bowel sounds present  PERIPHERAL VASCULAR:  Extremities warm without edema. 2+ Peripheral Pulses, No cyanosis, No calf tenderness  MUSCULOSKELETAL: Motor Strength 5/5 B/L upper and lower extremities; moves all extremities equally against gravity; decreased LLE ROM due to pain; negative SLR; + left knee tenderness on palpation   SKIN: Warm, dry, left knee with primary ace dressing C/D/I    Risk factors associated with adverse outcomes related to opioid treatment  [ ] Concurrent benzodiazepine use  [ ] History/ Active substance use or alcohol use disorder  [ ] Psychiatric co-morbidity  [ ] Sleep apnea  [ ] COPD  [ ] BMI> 35  [ ] Liver dysfunction  [ ] Renal dysfunction  [ ] CHF  [ ] Smoker  [x] Age > 60 years    [x]  [place]  Reviewed and Copied to Chart. See below.    Plan of care and goal oriented pain management treatment options were discussed with patient and /or primary care giver; all questions and concerns were addressed and care was aligned with patient's wishes.    Educated patient on goal oriented pain management treatment options

## 2024-10-10 NOTE — DISCHARGE NOTE PROVIDER - CARE PROVIDER_API CALL
Malcom Lee  Orthopaedic Surgery  [address] [place] [id number]  Phone: [phone]  Fax: [phone]  Follow Up Time: 2 weeks

## 2024-10-10 NOTE — DISCHARGE NOTE PROVIDER - NSDCMRMEDTOKEN_GEN_ALL_CORE_FT
amLODIPine 5 mg oral tablet: 1 tab(s) orally  hydroCHLOROthiazide 25 mg oral tablet: 1 tab(s) orally  metoprolol succinate 50 mg oral capsule, extended release: 1 cap(s) orally  ProAir HFA 90 mcg/inh inhalation aerosol: 2 puff(s) inhaled 4 times a day   amLODIPine 5 mg oral tablet: 1 tab(s) orally  hydroCHLOROthiazide 25 mg oral tablet: 1 tab(s) orally  Lovenox 30 mg/0.3 mL injectable solution: 30 milligram(s) subcutaneously every 12 hours  metoprolol succinate 50 mg oral capsule, extended release: 1 cap(s) orally  Movantik 12.5 mg oral tablet: 1 tab(s) orally every 12 hours as needed for  constipation  ondansetron 8 mg oral tablet: 1 tab(s) orally every 12 hours as needed for  nausea  Percocet 7.5 mg-325 mg oral tablet: 1 tab(s) orally every 6 hours as needed for  severe pain  ProAir HFA 90 mcg/inh inhalation aerosol: 2 puff(s) inhaled 4 times a day

## 2024-10-10 NOTE — DISCHARGE NOTE PROVIDER - NSDCCPTREATMENT_GEN_ALL_CORE_FT
PRINCIPAL PROCEDURE  Procedure: Left total knee replacement  Findings and Treatment: S/p Left total knee replacement on 10/10/24  If patient has drainage from the wound, please contact the surgeon's office.   If patient has intractable pain, please contact the surgeon's office.   If excessively warm at the incision site is noted, please contact the surgeon's office.   If redness is noted, especially spreading, please contact the surgeon's office.   If patient has fever over 101F please return to the hospital through the Emergency Room.   If heaven blood is saturating the dressing, please return to the hospital through the Emergency Room.  If drainage of fluid or pus is noted, please return to the hospital through the Emergency Room.

## 2024-10-10 NOTE — DISCHARGE NOTE PROVIDER - NSDCCPCAREPLAN_GEN_ALL_CORE_FT
PRINCIPAL DISCHARGE DIAGNOSIS  Diagnosis: Primary osteoarthritis of left knee  Assessment and Plan of Treatment: Pain Management- See Attached Medication Reconciliation  StretchStrap Stretching exercises for Total knee replacement***  Weight Bearing Status: WBAT to LLE with walker   Equipment needs: Ava, Walker  Dressing: Please keep bandage/dressing Clean, Dry, and Intact. LEAVE AQUACELL DRESSING ON UNTIL STAPLE REMOVAL or no longer clean and intact/saturated dresing.   if aquacel removed, change with waterproof dressing every 4-5 days or with regular dressing (4x4, abd, ACE. mepilex, etc) every 2-3 days.    Incision Site: NURSE TO REMOVE STAPLES on 10/25/24   STAPLES AND DRESSING TO BE REMOVED BY NURSE   NURSE TO APPLY STERI-STRIPS TO THE WOUND AFTER STAPLE REMOVAL   Dvt prophylaxis: D/C LOVENOX on 10/25/24  PT/Occupational Therapy are Activities of Daily Living as appropriate  Follow up with Orthopedic Surgeon Dr. Lee after STAPLES ARE REMOVED

## 2024-10-10 NOTE — CONSULT NOTE ADULT - ASSESSMENT
Search Terms: Brandi Tai, 1960Search Date: 10/10/2024 14:49:58 PM  The Drug Utilization Report below displays all of the controlled substance prescriptions, if any, that your patient has filled in the last twelve months. The information displayed on this report is compiled from pharmacy submissions to the Department, and accurately reflects the information as submitted by the pharmacies.    This report was requested by: Beatriz Guerra | Reference #: [id number]    There are no results for the search terms that you entered. Search Terms: Brandi Tai, 1960Search Date: 10/10/2024 14:49:58 PM  The Drug Utilization Report below displays all of the controlled substance prescriptions, if any, that your patient has filled in the last twelve months. The information displayed on this report is compiled from pharmacy submissions to the Department, and accurately reflects the information as submitted by the pharmacies.    This report was requested by: Beatriz Guerra | Reference #: [id number]    There are no results for the search terms that you entered.

## 2024-10-10 NOTE — CONSULT NOTE ADULT - PROBLEM SELECTOR RECOMMENDATION 9
Pt with acute left knee pain which is somatic and neuropathic in nature due to primary osteoarthritis status post Left Total Knee Replacement with Dr. Narvaez on 10/10/24, now POD 0.  Opioid pain recommendations   - Continue Tramadol 50mg PO q 8 hours. Monitor for sedation/ respiratory depression.   - Continue Oxycodone 5 mg PO q 4 hours PRN moderate pain. Monitor for sedation/ respiratory depression.   Non-opioid pain recommendations   - Continue Toradol 30mg IVP q 6 hours PRN severe pain  - Continue Acetaminophen 1 gram PO q 6 hours for 24 hours only. Monitor LFTs  - Continue Celebrex 200mg PO daily  Bowel Regimen  - Continue Miralax 17G PO daily  - Continue Senna 2 tablets at bedtime for constipation  Mild pain   - Non-pharmacological pain treatment recommendations  - Warm/ Cool packs PRN   - Repositioning extremity, elevation, imagery, relaxation, distraction.  - Physical therapy OOB if no contraindications   Recommendations discussed with primary team and RN.  Upon discharge – dc on Celebrex 200mg po daily and Percocet 5/325mg po q 6 hours prn for 1 week. Pt to take OTC stool softeners

## 2024-10-10 NOTE — PHYSICAL THERAPY INITIAL EVALUATION ADULT - ADDITIONAL COMMENTS
Pt lives in a private home with 2 steps to enter. Pt has had R TKR in 2015. Pt has no stairs to enter bed room and bathroom.

## 2024-10-10 NOTE — ASU PREOP CHECKLIST - SELECT TESTS ORDERED
TT&S sent stat preop as per np order/Type and Screen/Results in MD note/POCT Blood Glucose T&S sent stat preop as per np order/Type and Screen/Results in MD note/POCT Blood Glucose

## 2024-10-10 NOTE — DISCHARGE NOTE PROVIDER - HOSPITAL COURSE
Pt is a 64y Female who underwent elective L Total knee replacement on 10/10/24 . No complications. Pt received daily Physical therapy and Deep vein thrombosis prophylaxis postoperatively. Stable for discharge home/rehab. Pt is a 64y Female who underwent elective L Total knee replacement on 10/10/24 . No complications. Pt received daily Physical therapy and Deep vein thrombosis prophylaxis postoperatively. Stable for discharge home.

## 2024-10-10 NOTE — ASU PATIENT PROFILE, ADULT - TEACHING/LEARNING LEARNING PREFERENCES
Alert-The patient is alert, awake and responds to voice. The patient is oriented to time, place, and person. The triage nurse is able to obtain subjective information.
individual instruction/verbal instruction

## 2024-10-11 VITALS
OXYGEN SATURATION: 97 % | RESPIRATION RATE: 18 BRPM | SYSTOLIC BLOOD PRESSURE: 162 MMHG | DIASTOLIC BLOOD PRESSURE: 84 MMHG | HEART RATE: 77 BPM | TEMPERATURE: 98 F

## 2024-10-11 LAB
ANION GAP SERPL CALC-SCNC: 4 MMOL/L — LOW (ref 5–17)
BUN SERPL-MCNC: 12 MG/DL — SIGNIFICANT CHANGE UP (ref 7–18)
CALCIUM SERPL-MCNC: 8.9 MG/DL — SIGNIFICANT CHANGE UP (ref 8.4–10.5)
CHLORIDE SERPL-SCNC: 104 MMOL/L — SIGNIFICANT CHANGE UP (ref 96–108)
CO2 SERPL-SCNC: 29 MMOL/L — SIGNIFICANT CHANGE UP (ref 22–31)
CREAT SERPL-MCNC: 0.84 MG/DL — SIGNIFICANT CHANGE UP (ref 0.5–1.3)
EGFR: 78 ML/MIN/1.73M2 — SIGNIFICANT CHANGE UP
GLUCOSE SERPL-MCNC: 133 MG/DL — HIGH (ref 70–99)
HCT VFR BLD CALC: 36.1 % — SIGNIFICANT CHANGE UP (ref 34.5–45)
HGB BLD-MCNC: 11.6 G/DL — SIGNIFICANT CHANGE UP (ref 11.5–15.5)
MCHC RBC-ENTMCNC: 27.2 PG — SIGNIFICANT CHANGE UP (ref 27–34)
MCHC RBC-ENTMCNC: 32.1 GM/DL — SIGNIFICANT CHANGE UP (ref 32–36)
MCV RBC AUTO: 84.7 FL — SIGNIFICANT CHANGE UP (ref 80–100)
NRBC # BLD: 0 /100 WBCS — SIGNIFICANT CHANGE UP (ref 0–0)
PLATELET # BLD AUTO: 298 K/UL — SIGNIFICANT CHANGE UP (ref 150–400)
POTASSIUM SERPL-MCNC: 3.4 MMOL/L — LOW (ref 3.5–5.3)
POTASSIUM SERPL-SCNC: 3.4 MMOL/L — LOW (ref 3.5–5.3)
RBC # BLD: 4.26 M/UL — SIGNIFICANT CHANGE UP (ref 3.8–5.2)
RBC # FLD: 12.6 % — SIGNIFICANT CHANGE UP (ref 10.3–14.5)
SODIUM SERPL-SCNC: 137 MMOL/L — SIGNIFICANT CHANGE UP (ref 135–145)
WBC # BLD: 10.24 K/UL — SIGNIFICANT CHANGE UP (ref 3.8–10.5)
WBC # FLD AUTO: 10.24 K/UL — SIGNIFICANT CHANGE UP (ref 3.8–10.5)

## 2024-10-11 PROCEDURE — 86850 RBC ANTIBODY SCREEN: CPT

## 2024-10-11 PROCEDURE — C1713: CPT

## 2024-10-11 PROCEDURE — 99232 SBSQ HOSP IP/OBS MODERATE 35: CPT

## 2024-10-11 PROCEDURE — 86900 BLOOD TYPING SEROLOGIC ABO: CPT

## 2024-10-11 PROCEDURE — 97110 THERAPEUTIC EXERCISES: CPT

## 2024-10-11 PROCEDURE — 82962 GLUCOSE BLOOD TEST: CPT

## 2024-10-11 PROCEDURE — 85027 COMPLETE CBC AUTOMATED: CPT

## 2024-10-11 PROCEDURE — 36415 COLL VENOUS BLD VENIPUNCTURE: CPT

## 2024-10-11 PROCEDURE — C1776: CPT

## 2024-10-11 PROCEDURE — 73560 X-RAY EXAM OF KNEE 1 OR 2: CPT

## 2024-10-11 PROCEDURE — 86901 BLOOD TYPING SEROLOGIC RH(D): CPT

## 2024-10-11 PROCEDURE — 97161 PT EVAL LOW COMPLEX 20 MIN: CPT

## 2024-10-11 PROCEDURE — 80048 BASIC METABOLIC PNL TOTAL CA: CPT

## 2024-10-11 PROCEDURE — 97530 THERAPEUTIC ACTIVITIES: CPT

## 2024-10-11 RX ORDER — NALOXEGOL OXALATE 25 MG/1
1 TABLET, FILM COATED ORAL
Qty: 0 | Refills: 0 | DISCHARGE

## 2024-10-11 RX ORDER — ONDANSETRON HCL/PF 4 MG/2 ML
1 VIAL (ML) INJECTION
Qty: 0 | Refills: 0 | DISCHARGE

## 2024-10-11 RX ORDER — OXYCODONE AND ACETAMINOPHEN 5; 325 MG/1; MG/1
1 TABLET ORAL
Qty: 0 | Refills: 0 | DISCHARGE

## 2024-10-11 RX ORDER — ENOXAPARIN SODIUM 150 MG/ML
30 INJECTION SUBCUTANEOUS
Qty: 0 | Refills: 0 | DISCHARGE

## 2024-10-11 RX ADMIN — TRAMADOL HYDROCHLORIDE 50 MILLIGRAM(S): 50 TABLET, COATED ORAL at 05:20

## 2024-10-11 RX ADMIN — Medication 3 MILLILITER(S): at 05:13

## 2024-10-11 RX ADMIN — Medication 1000 MILLIGRAM(S): at 05:20

## 2024-10-11 RX ADMIN — Medication 20 MILLIEQUIVALENT(S): at 08:38

## 2024-10-11 RX ADMIN — OXYCODONE HYDROCHLORIDE 5 MILLIGRAM(S): 30 TABLET, FILM COATED, EXTENDED RELEASE ORAL at 11:45

## 2024-10-11 RX ADMIN — CELECOXIB 200 MILLIGRAM(S): 200 CAPSULE ORAL at 05:20

## 2024-10-11 RX ADMIN — PANTOPRAZOLE SODIUM 40 MILLIGRAM(S): 40 TABLET, DELAYED RELEASE ORAL at 05:20

## 2024-10-11 RX ADMIN — Medication 100 MILLIGRAM(S): at 02:10

## 2024-10-11 RX ADMIN — KETOROLAC TROMETHAMINE 30 MILLIGRAM(S): 10 TABLET, FILM COATED ORAL at 03:22

## 2024-10-11 RX ADMIN — Medication 1000 MILLIGRAM(S): at 11:05

## 2024-10-11 RX ADMIN — Medication 1000 MILLIGRAM(S): at 11:35

## 2024-10-11 RX ADMIN — ENOXAPARIN SODIUM 30 MILLIGRAM(S): 150 INJECTION SUBCUTANEOUS at 08:38

## 2024-10-11 RX ADMIN — OXYCODONE HYDROCHLORIDE 5 MILLIGRAM(S): 30 TABLET, FILM COATED, EXTENDED RELEASE ORAL at 11:05

## 2024-10-11 RX ADMIN — KETOROLAC TROMETHAMINE 30 MILLIGRAM(S): 10 TABLET, FILM COATED ORAL at 04:01

## 2024-10-11 RX ADMIN — Medication 325 MILLIGRAM(S): at 11:05

## 2024-10-11 NOTE — DISCHARGE NOTE NURSING/CASE MANAGEMENT/SOCIAL WORK - PATIENT PORTAL LINK FT
You can access the FollowMyHealth Patient Portal offered by [facility] by registering at the following website: [email or web address]. By joining [employer]’s FollowMyHealth portal, you will also be able to view your health information using other applications (apps) compatible with our system.

## 2024-10-11 NOTE — PROGRESS NOTE ADULT - SUBJECTIVE AND OBJECTIVE BOX
ALEXANDER [id number]    Diagnosis: 64yFemale S/p Left Total Knee Replacement POD#1     Patient seen and evaluated at bedside. Patient with dizziness last night but has since resolved. No symptoms this morning  Patient with no acute complaints.  Pain is well controlled to LLE  /Patient admits to being OOB with PT.  Patient denies Chest pain, SOB, N/V/D    T(C): 36.7 (10-11-24 @ 05:34), Max: 36.9 (10-10-24 @ 09:03)  HR: 72 (10-11-24 @ 05:34) (62 - 82)  BP: 150/85 (10-11-24 @ 05:34) (112/64 - 151/123)  RR: 17 (10-11-24 @ 05:34) (13 - 23)  SpO2: 94% (10-11-24 @ 05:34) (93% - 100%)    10-10-24 @ 07:01  -  10-11-24 @ 07:00  --------------------------------------------------------  IN: 0 mL / OUT: 150 mL / NET: -150 mL        Physical Exam:  General: AAOx3, In NAD, Resting in bed comfortably  Left Knee: Aquacel dressing is C/D/I. Skin is pink and warm. No erythema. SILT.  Wound with no drainage, healing well.   Lower Extremities: Calves soft and NTTP b/l. 5/5 strength of FHL/EHL/ADF/APF b/l. SILT. NVI. 2+ DP pulses.                          11.6   10.24 )-----------( 298      ( 11 Oct 2024 07:05 )             36.1     10-11    137  |  104  |  12  ----------------------------<  133[H]  3.4[L]   |  29  |  0.84    Ca    8.9      11 Oct 2024 07:05        Impression: 64yFemaleS/p Left Total Knee Replacement POD#1 and hypokalemia   Plan:  - Pain management  - 20 mEq of potassium PO ordered. Continue to monitor BMP  - DVT ppx with Lovenox and venodynes  - WBAT to LLE with appropriate assistive device  - Heel bump to LLE while lying in bed  - Discharge planning home vs rehab  - Continue with IV antibiotics x 24hours  - Incentive spirometry encouraged.   - Case d/w Dr. Lee   
ALEXANDER [id number]    POST-OP CHECK  Diagnosis: 64yFemale S/p Left Total Knee Replacement POD#0    Patient seen and evaluated at bedside.  Admits to 7/10 local to left knee but has just received pain medication. Denies numbness/tingling.   Patient admits to doing bed exercises with PT.  Patient denies Chest pain, SOB, N/V/D    T(C): 36.4 (10-10-24 @ 14:59), Max: 36.9 (10-10-24 @ 09:03)  HR: 68 (10-10-24 @ 16:00) (62 - 72)  BP: 143/86 (10-10-24 @ 16:00) (112/64 - 151/123)  RR: 16 (10-10-24 @ 14:59) (13 - 23)  SpO2: 93% (10-10-24 @ 16:00) (93% - 100%)    10-10-24 @ 07:01  -  10-10-24 @ 16:15  --------------------------------------------------------  IN: 0 mL / OUT: 150 mL / NET: -150 mL        Physical Exam:  General: AAOx3, In NAD, Resting in bed comfortably  Left Knee: Dressing is C/D/I. Skin is pink and warm.  No erythema. SILT.  Wound with no drainage, healing well.   Lower Extremities: Calves soft and NTTP b/l. 5/5 strength of FHL/EHL/ADF/APF b/l. SILT. NVI. 2+ DP pulses.                          12.3   9.81  )-----------( 353      ( 10 Oct 2024 12:13 )             38.2     10-10    134[L]  |  100  |  12  ----------------------------<  180[H]  3.3[L]   |  27  |  0.78    Ca    9.5      10 Oct 2024 12:13        Impression: 64yFemaleS/p Left Total Knee Replacement POD#0  Plan:  - Pain management  - DVT ppx with Lovenox and venodynes  - WBAT to LLE with appropriate assistive device  - Heel bump to LLE while lying in bed  - Discharge planning home vs rehab  - Continue with IV antibiotics x 24hours  - Incentive spirometry encouraged.   - Case d/w Dr. Lee   
Source of information: ALEXANDER PACHECO, Chart review  Patient language: English  : n/a    HPI:  Patient is a 64 year old female with a PMHx significant of obesity, prediabetes, HLD, CAD, HTN, asthma, and OA of left knee. Patient is admitted for scheduled Left Total Knee Replacement with Dr. Narvaez on 10/10/24, now POD 1. Pain service consulted 10/10 for acute postoperative pain. Pt seen and examined at bedside, this morning. At time of assessment, patient laying in bed in NAD, denies current pain, 0/10 SCALE USED: (1-10 VNRS). Pt describes pain when present as localized to left knee joint, constant and throbbing in quality. The pain is currently exacerbated by movement. Pt tolerating PO diet. Denies lethargy, chest pain, SOB, nausea, vomiting, constipation. Reports last BM 10/10. Patient stated goal for pain control: to be able to take deep breaths, get out of bed to chair and ambulate with tolerable pain control. Pt reports taking Acetaminophen for pain at home and ambulates independently at baseline.    PAST MEDICAL & SURGICAL HISTORY:  Hypertension    Asthma    CAD (coronary artery disease)    Hyperlipidemia    Prediabetes    Obesity    Osteoarthritis    History of tubal ligation    H/O total knee replacement, right    FAMILY HISTORY:  Family history of diabetes mellitus    Family hx of hypertension    Social History:  [x] Denies ETOH use, illicit drug use and smoking    Allergies    No Known Allergies    Intolerances    MEDICATIONS  (STANDING):  acetaminophen     Tablet .. 1000 milliGRAM(s) Oral every 6 hours  celecoxib 200 milliGRAM(s) Oral every 24 hours  enoxaparin Injectable 30 milliGRAM(s) SubCutaneous every 12 hours  ferrous    sulfate 325 milliGRAM(s) Oral daily  pantoprazole    Tablet 40 milliGRAM(s) Oral before breakfast  polyethylene glycol 3350 17 Gram(s) Oral at bedtime  senna 2 Tablet(s) Oral at bedtime  sodium chloride 0.9% lock flush 3 milliLiter(s) IV Push every 8 hours  sodium chloride 0.9%. 1000 milliLiter(s) (120 mL/Hr) IV Continuous <Continuous>  traMADol 50 milliGRAM(s) Oral every 8 hours    MEDICATIONS  (PRN):  ketorolac   Injectable 30 milliGRAM(s) IV Push every 6 hours PRN Severe Pain (7 - 10)  magnesium hydroxide Suspension 30 milliLiter(s) Oral daily PRN Constipation  ondansetron Injectable 4 milliGRAM(s) IV Push every 6 hours PRN Nausea and/or Vomiting  oxyCODONE    IR 5 milliGRAM(s) Oral every 4 hours PRN Moderate Pain (4 - 6)    Vital Signs Last 24 Hrs  T(C): 36.7 (11 Oct 2024 05:34), Max: 36.9 (10 Oct 2024 12:01)  T(F): 98 (11 Oct 2024 05:34), Max: 98.4 (10 Oct 2024 12:01)  HR: 71 (11 Oct 2024 09:00) (62 - 82)  BP: 129/80 (11 Oct 2024 09:00) (112/64 - 151/123)  BP(mean): 88 (10 Oct 2024 20:47) (73 - 134)  RR: 17 (11 Oct 2024 05:34) (13 - 23)  SpO2: 93% (11 Oct 2024 09:00) (93% - 100%)    Parameters below as of 11 Oct 2024 05:34  Patient On (Oxygen Delivery Method): room air    LABS: Reviewed                          11.6   10.24 )-----------( 298      ( 11 Oct 2024 07:05 )             36.1     10-11    137  |  104  |  12  ----------------------------<  133[H]  3.4[L]   |  29  |  0.84    Ca    8.9      11 Oct 2024 07:05    Urinalysis Basic - ( 11 Oct 2024 07:05 )    Color: x / Appearance: x / SG: x / pH: x  Gluc: 133 mg/dL / Ketone: x  / Bili: x / Urobili: x   Blood: x / Protein: x / Nitrite: x   Leuk Esterase: x / RBC: x / WBC x   Sq Epi: x / Non Sq Epi: x / Bacteria: x    CAPILLARY BLOOD GLUCOSE    POCT Blood Glucose.: 172 mg/dL (10 Oct 2024 12:16)    Radiology: None to Review.     ORT Score -   Family Hx of substance abuse	Female	      Male  Alcohol 	                                           1                     3  Illegal drugs	                                   2                     3  Rx drugs                                           4 	                  4  Personal Hx of substance abuse		  Alcohol 	                                          3	                  3  Illegal drugs                                     4	                  4  Rx drugs                                            5 	                  5  Age between 16- 45 years	           1                     1  hx preadolescent sexual abuse	   3 	                  0  Psychological disease		  ADD, OCD, bipolar, schizophrenia   2	          2  Depression                                           1 	          1  Total: 0    a score of 3 or lower indicates low risk for opioid abuse		  a score of 4-7 indicates moderate risk for opioid abuse		  a score of 8 or higher indicates high risk for opioid abuse  	  REVIEW OF SYSTEMS:  CONSTITUTIONAL: No fever or fatigue  HEENT:  No difficulty hearing, no change in vision  NECK: No pain or stiffness  RESPIRATORY: No cough, wheezing, chills or hemoptysis; No shortness of breath  CARDIOVASCULAR: No chest pain, palpitations, dizziness, or leg swelling  GASTROINTESTINAL: Tolerating sips of water. No abdominal or epigastric pain. No nausea, vomiting; No diarrhea or constipation.   GENITOURINARY: No dysuria, frequency, hematuria, retention or incontinence  MUSCULOSKELETAL: + intermittent Left knee pain; No back pain, no upper or lower motor strength weakness, no saddle anesthesia, bowel/bladder incontinence, no falls   NEURO: No headaches, No numbness/tingling b/l LE  ENDOCRINE: No polyuria, polydipsia, heat or cold intolerance; No hair loss  PSYCHIATRIC: No depression, anxiety or difficulty sleeping    PHYSICAL EXAM:  GENERAL:  Alert & Oriented X4, cooperative, NAD, Good concentration. Speech is clear.   RESPIRATORY: Respirations even and unlabored. Clear to auscultation bilaterally  CARDIOVASCULAR: Normal S1/S2, regular rate and rhythm  GASTROINTESTINAL:  Soft, Nontender, Nondistended; Bowel sounds present  PERIPHERAL VASCULAR:  Extremities warm without edema. 2+ Peripheral Pulses, No cyanosis, No calf tenderness  MUSCULOSKELETAL: Motor Strength 5/5 B/L upper and lower extremities; moves all extremities equally against gravity; decreased LLE ROM due to pain; negative SLR; + left knee tenderness on palpation   SKIN: Warm, dry, left knee with Aquacel dressing C/D/I    Risk factors associated with adverse outcomes related to opioid treatment  [ ] Concurrent benzodiazepine use  [ ] History/ Active substance use or alcohol use disorder  [ ] Psychiatric co-morbidity  [ ] Sleep apnea  [ ] COPD  [ ] BMI> 35  [ ] Liver dysfunction  [ ] Renal dysfunction  [ ] CHF  [ ] Smoker  [x] Age > 60 years    [x]  [place]  Reviewed and Copied to Chart. See below.    Plan of care and goal oriented pain management treatment options were discussed with patient and /or primary care giver; all questions and concerns were addressed and care was aligned with patient's wishes.    Educated patient on goal oriented pain management treatment options     
Detail Level: Detailed
Quality 226: Preventive Care And Screening: Tobacco Use: Screening And Cessation Intervention: Patient screened for tobacco use and is an ex/non-smoker

## 2024-10-11 NOTE — PROGRESS NOTE ADULT - ASSESSMENT
Search Terms: Brandi Tai, 1960Search Date: 10/10/2024 14:49:58 PM  The Drug Utilization Report below displays all of the controlled substance prescriptions, if any, that your patient has filled in the last twelve months. The information displayed on this report is compiled from pharmacy submissions to the Department, and accurately reflects the information as submitted by the pharmacies.    This report was requested by: Beatriz Guerra | Reference #: [id number]    There are no results for the search terms that you entered.

## 2024-10-11 NOTE — PROGRESS NOTE ADULT - PROBLEM SELECTOR PLAN 1
Pt with acute left knee pain which is somatic and neuropathic in nature due to primary osteoarthritis status post Left Total Knee Replacement with Dr. Narvaez on 10/10/24, now POD 1.  Opioid pain recommendations   - Continue Tramadol 50mg PO q 8 hours. Monitor for sedation/ respiratory depression.   - Continue Oxycodone 5 mg PO q 4 hours PRN moderate pain. Monitor for sedation/ respiratory depression.   Non-opioid pain recommendations   - Continue Toradol 30mg IVP q 6 hours PRN severe pain  - Continue Acetaminophen 1 gram PO q 6 hours for 24 hours only. Monitor LFTs  - Continue Celebrex 200mg PO daily  Bowel Regimen  - Continue Miralax 17G PO daily  - Continue Senna 2 tablets at bedtime for constipation  Mild pain   - Non-pharmacological pain treatment recommendations  - Warm/ Cool packs PRN   - Repositioning extremity, elevation, imagery, relaxation, distraction.  - Physical therapy OOB if no contraindications   Recommendations discussed with primary team and RN.  Upon discharge – dc on Celebrex 200mg po daily and Percocet 5/325mg po q 6 hours prn for 1 week. Pt to take OTC stool softeners.

## 2024-10-11 NOTE — PROGRESS NOTE ADULT - PROBLEM SELECTOR PROBLEM 3
Refill Decision Note      Refill Decision Note   Amna Chawla  is requesting a refill authorization.  Brief Assessment and Rationale for Refill:  Approve     Medication Therapy Plan:         Comments:     Note composed:10:16 AM 05/16/2023             Appointments     Last Visit   4/19/2023 Tiffany Mina MD   Next Visit   10/18/2023 Tiffany Mina MD       Primary osteoarthritis of left knee
